# Patient Record
Sex: FEMALE | Race: BLACK OR AFRICAN AMERICAN | Employment: FULL TIME | URBAN - METROPOLITAN AREA
[De-identification: names, ages, dates, MRNs, and addresses within clinical notes are randomized per-mention and may not be internally consistent; named-entity substitution may affect disease eponyms.]

---

## 2017-03-21 ENCOUNTER — HOSPITAL ENCOUNTER (EMERGENCY)
Age: 37
Discharge: HOME | End: 2017-03-21
Attending: INTERNAL MEDICINE | Admitting: INTERNAL MEDICINE
Payer: MEDICAID

## 2017-03-21 VITALS
DIASTOLIC BLOOD PRESSURE: 70 MMHG | BODY MASS INDEX: 20.89 KG/M2 | TEMPERATURE: 98.1 F | HEIGHT: 66 IN | SYSTOLIC BLOOD PRESSURE: 113 MMHG | OXYGEN SATURATION: 99 % | WEIGHT: 130 LBS | HEART RATE: 66 BPM | RESPIRATION RATE: 20 BRPM

## 2017-03-21 DIAGNOSIS — N30.01 ACUTE CYSTITIS WITH HEMATURIA: Primary | ICD-10-CM

## 2017-03-21 DIAGNOSIS — N92.6 ABNORMAL MENSES: ICD-10-CM

## 2017-03-21 DIAGNOSIS — R11.2 NON-INTRACTABLE VOMITING WITH NAUSEA, UNSPECIFIED VOMITING TYPE: ICD-10-CM

## 2017-03-21 LAB
ALBUMIN SERPL BCP-MCNC: 4.4 G/DL (ref 3.4–5)
ALBUMIN/GLOB SERPL: 1 {RATIO} (ref 0.8–1.7)
ALP SERPL-CCNC: 67 U/L (ref 45–117)
ALT SERPL-CCNC: 17 U/L (ref 13–56)
ANION GAP BLD CALC-SCNC: 13 MMOL/L (ref 3–18)
APPEARANCE UR: ABNORMAL
AST SERPL W P-5'-P-CCNC: 19 U/L (ref 15–37)
BACTERIA URNS QL MICRO: ABNORMAL /HPF
BASOPHILS # BLD AUTO: 0 K/UL (ref 0–0.06)
BASOPHILS # BLD: 0 % (ref 0–2)
BILIRUB SERPL-MCNC: 0.5 MG/DL (ref 0.2–1)
BILIRUB UR QL: NEGATIVE
BUN SERPL-MCNC: 11 MG/DL (ref 7–18)
BUN/CREAT SERPL: 14 (ref 12–20)
CALCIUM SERPL-MCNC: 9.1 MG/DL (ref 8.5–10.1)
CHLORIDE SERPL-SCNC: 103 MMOL/L (ref 100–108)
CO2 SERPL-SCNC: 29 MMOL/L (ref 21–32)
COLOR UR: ABNORMAL
CREAT SERPL-MCNC: 0.8 MG/DL (ref 0.6–1.3)
DIFFERENTIAL METHOD BLD: NORMAL
EOSINOPHIL # BLD: 0 K/UL (ref 0–0.4)
EOSINOPHIL NFR BLD: 1 % (ref 0–5)
EPITH CASTS URNS QL MICRO: ABNORMAL /LPF (ref 0–5)
ERYTHROCYTE [DISTWIDTH] IN BLOOD BY AUTOMATED COUNT: 13.2 % (ref 11.6–14.5)
GLOBULIN SER CALC-MCNC: 4.4 G/DL (ref 2–4)
GLUCOSE SERPL-MCNC: 76 MG/DL (ref 74–99)
GLUCOSE UR STRIP.AUTO-MCNC: NEGATIVE MG/DL
HCG UR QL: NEGATIVE
HCT VFR BLD AUTO: 39.1 % (ref 35–45)
HGB BLD-MCNC: 13 G/DL (ref 12–16)
HGB UR QL STRIP: ABNORMAL
KETONES UR QL STRIP.AUTO: NEGATIVE MG/DL
LEUKOCYTE ESTERASE UR QL STRIP.AUTO: ABNORMAL
LYMPHOCYTES # BLD AUTO: 30 % (ref 21–52)
LYMPHOCYTES # BLD: 1.4 K/UL (ref 0.9–3.6)
MCH RBC QN AUTO: 30 PG (ref 24–34)
MCHC RBC AUTO-ENTMCNC: 33.2 G/DL (ref 31–37)
MCV RBC AUTO: 90.3 FL (ref 74–97)
MONOCYTES # BLD: 0.3 K/UL (ref 0.05–1.2)
MONOCYTES NFR BLD AUTO: 6 % (ref 3–10)
MUCOUS THREADS URNS QL MICRO: ABNORMAL /LPF
NEUTS SEG # BLD: 3 K/UL (ref 1.8–8)
NEUTS SEG NFR BLD AUTO: 63 % (ref 40–73)
NITRITE UR QL STRIP.AUTO: NEGATIVE
PH UR STRIP: >8.5 [PH] (ref 5–8)
PLATELET # BLD AUTO: 264 K/UL (ref 135–420)
PMV BLD AUTO: 10.9 FL (ref 9.2–11.8)
POTASSIUM SERPL-SCNC: 3.5 MMOL/L (ref 3.5–5.5)
PROT SERPL-MCNC: 8.8 G/DL (ref 6.4–8.2)
PROT UR STRIP-MCNC: 100 MG/DL
RBC # BLD AUTO: 4.33 M/UL (ref 4.2–5.3)
RBC #/AREA URNS HPF: ABNORMAL /HPF (ref 0–5)
SODIUM SERPL-SCNC: 145 MMOL/L (ref 136–145)
SP GR UR REFRACTOMETRY: 1.02 (ref 1–1.03)
UROBILINOGEN UR QL STRIP.AUTO: 0.2 EU/DL (ref 0.2–1)
WBC # BLD AUTO: 4.7 K/UL (ref 4.6–13.2)
WBC URNS QL MICRO: ABNORMAL /HPF (ref 0–5)

## 2017-03-21 PROCEDURE — 80053 COMPREHEN METABOLIC PANEL: CPT | Performed by: INTERNAL MEDICINE

## 2017-03-21 PROCEDURE — 81001 URINALYSIS AUTO W/SCOPE: CPT | Performed by: PHYSICIAN ASSISTANT

## 2017-03-21 PROCEDURE — 96374 THER/PROPH/DIAG INJ IV PUSH: CPT

## 2017-03-21 PROCEDURE — 96361 HYDRATE IV INFUSION ADD-ON: CPT

## 2017-03-21 PROCEDURE — 99284 EMERGENCY DEPT VISIT MOD MDM: CPT

## 2017-03-21 PROCEDURE — 96375 TX/PRO/DX INJ NEW DRUG ADDON: CPT

## 2017-03-21 PROCEDURE — 74011000258 HC RX REV CODE- 258: Performed by: PHYSICIAN ASSISTANT

## 2017-03-21 PROCEDURE — 81025 URINE PREGNANCY TEST: CPT

## 2017-03-21 PROCEDURE — 74011250636 HC RX REV CODE- 250/636: Performed by: PHYSICIAN ASSISTANT

## 2017-03-21 PROCEDURE — 93005 ELECTROCARDIOGRAM TRACING: CPT

## 2017-03-21 PROCEDURE — 85025 COMPLETE CBC W/AUTO DIFF WBC: CPT | Performed by: INTERNAL MEDICINE

## 2017-03-21 RX ORDER — NITROFURANTOIN 25; 75 MG/1; MG/1
100 CAPSULE ORAL 2 TIMES DAILY
Qty: 14 CAP | Refills: 0 | Status: SHIPPED | OUTPATIENT
Start: 2017-03-21 | End: 2017-03-28

## 2017-03-21 RX ORDER — ONDANSETRON 2 MG/ML
4 INJECTION INTRAMUSCULAR; INTRAVENOUS
Status: COMPLETED | OUTPATIENT
Start: 2017-03-21 | End: 2017-03-21

## 2017-03-21 RX ORDER — ONDANSETRON 4 MG/1
4 TABLET, ORALLY DISINTEGRATING ORAL
Qty: 12 TAB | Refills: 0 | Status: SHIPPED | OUTPATIENT
Start: 2017-03-21 | End: 2017-04-07

## 2017-03-21 RX ADMIN — SODIUM CHLORIDE 1000 ML: 900 INJECTION, SOLUTION INTRAVENOUS at 19:03

## 2017-03-21 RX ADMIN — PROMETHAZINE HYDROCHLORIDE 12.5 MG: 25 INJECTION, SOLUTION INTRAMUSCULAR; INTRAVENOUS at 21:19

## 2017-03-21 RX ADMIN — ONDANSETRON 4 MG: 2 INJECTION INTRAMUSCULAR; INTRAVENOUS at 19:03

## 2017-03-21 NOTE — ED PROVIDER NOTES
[address]  EMERGENCY DEPARTMENT HISTORY AND PHYSICAL EXAM       Date: 3/21/2017   Patient Name: Obdulia Arshad   YOB: 1980  Medical Record Number: [id number]    History of Presenting Illness     Chief Complaint   Patient presents with    Dizziness    Vaginal Bleeding        History Provided By:  patient    Additional History:   6:18 PM  Obdulia Arshad is a 29 y.o. female who presents to the emergency department C/O very heavy vaginal bleeding onset today. Associated symptoms include N/V/D, dizziness, lower abdominal cramping, chest pain secondary to vomiting, and chills. Reports she is currently on her NMP, but states her cycle began differently that her usual cycles because she had more days of spotting before her the heavy bleeding began. States she feels dehydrated. PMHx of anemia. Pt has recently began taking women's daily vitamins. Pt denies speech difficulty, fever, dysuria, urinary frequency, urinary urgency, back pain, cough, congestion, rhinorrhea, sore throat, and any other Sx or complaints. Primary Care Provider: Valentin Espinal MD   Specialist:    Past History     Past Medical History:   Past Medical History:   Diagnosis Date    Genital herpes     Miscarriage     Psychiatric disorder     anxiety, depression        Past Surgical History:   Past Surgical History:   Procedure Laterality Date    HX OTHER SURGICAL              Family History:   History reviewed. No pertinent family history. Social History:   Social History   Substance Use Topics    Smoking status: Former Smoker     Packs/day: 0.25    Smokeless tobacco: None    Alcohol use Yes      Comment: occasionally         Allergies:   No Known Allergies     Review of Systems   Review of Systems   Constitutional: Positive for chills. Negative for fever. HENT: Negative for congestion, rhinorrhea and sore throat. Respiratory: Negative for cough.     Cardiovascular: Positive for chest pain (secondary to emesis). Gastrointestinal: Positive for abdominal pain, diarrhea, nausea and vomiting. Genitourinary: Positive for vaginal bleeding. Negative for dysuria, frequency and urgency. Musculoskeletal: Negative for back pain. Neurological: Positive for dizziness. Negative for speech difficulty. All other systems reviewed and are negative. Physical Exam  Vitals:    03/21/17 2015 03/21/17 2030 03/21/17 2045 03/21/17 2100   BP:  108/65  101/61   Pulse:       Resp:       Temp:       SpO2: 100% 100% 98% 99%   Weight:       Height:           Physical Exam   Nursing note and vitals reviewed. Vital signs and nursing notes reviewed. CONSTITUTIONAL: Alert. Well-appearing; well-nourished; in no apparent distress. HEAD: Normocephalic; atraumatic. EYES: PERRL; EOM's intact. No nystagmus. Conjunctiva clear. ENT: TM's normal. External ear normal. Normal nose; no rhinorrhea. Normal pharynx. Tonsils not enlarged without exudate. Moist mucus membranes. NECK: Supple; FROM without difficulty, non-tender; no cervical lymphadenopathy. CV: Normal S1, S2; no murmurs, rubs, or gallops. No chest wall tenderness. RESPIRATORY: Normal chest excursion with respiration; breath sounds clear and equal bilaterally; no wheezes, rhonchi, or rales. GI: Normal bowel sounds; non-distended; non-tender; no guarding or rigidity; no palpable organomegaly. No CVA tenderness. BACK:  No evidence of trauma or deformity. Non-tender to palpation. FROM without difficulty. Negative straight leg raise bilaterally. EXT: Normal ROM in all four extremities; non-tender to palpation. SKIN: Normal for age and race; warm; dry; good turgor; no apparent lesions or exudate. NEURO: A & O x3. Cranial nerves 2-12 intact. Motor 5/5 bilaterally. Sensation intact. PSYCH:  Mood and affect appropriate.       Diagnostic Study Results     Labs -      Recent Results (from the past 12 hour(s))   CBC WITH AUTOMATED DIFF Collection Time: 03/21/17  6:40 PM   Result Value Ref Range    WBC 4.7 4.6 - 13.2 K/uL    RBC 4.33 4.20 - 5.30 M/uL    HGB 13.0 12.0 - 16.0 g/dL    HCT 39.1 35.0 - 45.0 %    MCV 90.3 74.0 - 97.0 FL    MCH 30.0 24.0 - 34.0 PG    MCHC 33.2 31.0 - 37.0 g/dL    RDW 13.2 11.6 - 14.5 %    PLATELET 300 305 - 295 K/uL    MPV 10.9 9.2 - 11.8 FL    NEUTROPHILS 63 40 - 73 %    LYMPHOCYTES 30 21 - 52 %    MONOCYTES 6 3 - 10 %    EOSINOPHILS 1 0 - 5 %    BASOPHILS 0 0 - 2 %    ABS. NEUTROPHILS 3.0 1.8 - 8.0 K/UL    ABS. LYMPHOCYTES 1.4 0.9 - 3.6 K/UL    ABS. MONOCYTES 0.3 0.05 - 1.2 K/UL    ABS. EOSINOPHILS 0.0 0.0 - 0.4 K/UL    ABS. BASOPHILS 0.0 0.0 - 0.06 K/UL    DF AUTOMATED     METABOLIC PANEL, COMPREHENSIVE    Collection Time: 03/21/17  6:40 PM   Result Value Ref Range    Sodium 145 136 - 145 mmol/L    Potassium 3.5 3.5 - 5.5 mmol/L    Chloride 103 100 - 108 mmol/L    CO2 29 21 - 32 mmol/L    Anion gap 13 3.0 - 18 mmol/L    Glucose 76 74 - 99 mg/dL    BUN 11 7.0 - 18 MG/DL    Creatinine 0.80 0.6 - 1.3 MG/DL    BUN/Creatinine ratio 14 12 - 20      GFR est AA >60 >60 ml/min/1.73m2    GFR est non-AA >60 >60 ml/min/1.73m2    Calcium 9.1 8.5 - 10.1 MG/DL    Bilirubin, total 0.5 0.2 - 1.0 MG/DL    ALT (SGPT) 17 13 - 56 U/L    AST (SGOT) 19 15 - 37 U/L    Alk.  phosphatase 67 45 - 117 U/L    Protein, total 8.8 (H) 6.4 - 8.2 g/dL    Albumin 4.4 3.4 - 5.0 g/dL    Globulin 4.4 (H) 2.0 - 4.0 g/dL    A-G Ratio 1.0 0.8 - 1.7     URINALYSIS W/ RFLX MICROSCOPIC    Collection Time: 03/21/17  6:45 PM   Result Value Ref Range    Color BLOODY      Appearance TURBID      Specific gravity 1.021 1.005 - 1.030      pH (UA) >8.5 (H) 5.0 - 8.0    Protein 100 (A) NEG mg/dL    Glucose NEGATIVE  NEG mg/dL    Ketone NEGATIVE  NEG mg/dL    Bilirubin NEGATIVE  NEG      Blood LARGE (A) NEG      Urobilinogen 0.2 0.2 - 1.0 EU/dL    Nitrites NEGATIVE  NEG      Leukocyte Esterase SMALL (A) NEG     URINE MICROSCOPIC ONLY    Collection Time: 03/21/17  6:45 PM Result Value Ref Range    WBC 4 to 10 0 - 5 /hpf    RBC TOO NUMEROUS TO COUNT 0 - 5 /hpf    Epithelial cells 2+ 0 - 5 /lpf    Bacteria 1+ (A) NEG /hpf    Mucus 1+ (A) NEG /lpf   HCG URINE, QL. - POC    Collection Time: 03/21/17  6:50 PM   Result Value Ref Range    Pregnancy test,urine (POC) NEGATIVE  NEG         Radiologic Studies -  No orders to display        Medical Decision Making   I am the first provider for this patient. I reviewed the vital signs, available nursing notes, past medical history, past surgical history, family history and social history. Vital Signs-Reviewed the patient's vital signs. Patient Vitals for the past 12 hrs:   Temp Pulse Resp BP SpO2   03/21/17 2100 - - - 101/61 99 %   03/21/17 2045 - - - - 98 %   03/21/17 2030 - - - 108/65 100 %   03/21/17 2015 - - - - 100 %   03/21/17 2000 - - - (!) 116/98 -   03/21/17 1930 - 66 - 99/51 100 %   03/21/17 1723 98.1 °F (36.7 °C) 100 20 125/82 100 %       Pulse Oximetry Analysis - Normal 100% on room air     Cardiac Monitor:   Rate: 100 bpm  Rhythm: Normal Sinus Rhythm with sinus arrhythmia     EKG interpretation: (Preliminary)  NSR with sinus arrhythmia. Rate 80 bpm. No STEMI. EKG read by Srikanth Hanson PA-C at 17:35     Old Medical Records: Nursing notes. ED Course:    6:18 PM   Initial assessment performed. PROGRESS NOTE:   8:15 PM  Pt has been re-examined by Srikanth Hanson PA-C. Pt feeling a lot better and would like to try some ginger ale. Medications Given in the ED:  Medications   sodium chloride 0.9 % bolus infusion 1,000 mL (0 mL IntraVENous IV Completed 3/21/17 2003)   ondansetron (ZOFRAN) injection 4 mg (4 mg IntraVENous Given 3/21/17 1903)   promethazine (PHENERGAN) 12.5 mg in 0.9% sodium chloride 50 mL IVPB (12.5 mg IntraVENous New Bag 3/21/17 2119)       Discharge Note:  8:49 PM  Pt has been reexamined. Patient has no new complaints, changes, or physical findings. Care plan outlined and precautions discussed. Results were reviewed with the patient. All medications were reviewed with the patient; will d/c home with Macrobid and Zofran. All of pt's questions and concerns were addressed. Patient was instructed and agrees to follow up with PCP, as well as to return to the ED upon further deterioration. Patient is ready to go home. Diagnosis   Clinical Impression:   1. Acute cystitis with hematuria    2. Non-intractable vomiting with nausea, unspecified vomiting type    3. Abnormal menses         Discussion:  28 y/o female presents with N/V, feeling weak, 1 episode of diarrhea, all with the onset of her menstrual cycle today. It is not heavier than usual. Denies fever. Abd is soft, non-tender. She had a HR of 100 bpm on arrival, but improved with IV hydrating. She is feeling better and was able to drink a little gingerale, but requests something else for nausea. Will give phenergan, PO challenge and d/c home with Zofran and Macrobid. Pt agrees with plan. Follow-up Information     Follow up With Details Gris Samson MD Call in 2 days For follow up with your PCP. [address] [place] [address]      THE [facility] EMERGENCY DEPT Go to As needed, If symptoms worsen. [address] [facility] [address] [phone]          Current Discharge Medication List      START taking these medications    Details   nitrofurantoin, macrocrystal-monohydrate, (MACROBID) 100 mg capsule Take 1 Cap by mouth two (2) times a day for 7 days. Qty: 14 Cap, Refills: 0      ondansetron (ZOFRAN ODT) 4 mg disintegrating tablet Take 1 Tab by mouth every eight (8) hours as needed for Nausea. Qty: 12 Tab, Refills: 0             _______________________________   Attestations: This note is prepared by Kehinde Rose, acting as a Scribe for Joyce Wood PA-C on 6:18 PM on 3/21/2017 .     Joyce Wood PA-C: The scribe's documentation has been prepared under my direction and personally reviewed by me in its entirety.   _______________________________

## 2017-03-21 NOTE — ED TRIAGE NOTES
Patient with complaints of heavy vaginal bleeding that started last night. Patient with complaints of dizziness, shortness of breath and vomiting. .Sepsis Screening completed    (  )Patient meets SIRS criteria. ( x )Patient does not meet SIRS criteria.       SIRS Criteria is achieved when two or more of the following are present   Temperature < 96.8°F (36°C) or > 100.9°F (38.3°C)   Heart Rate > 90 beats per minute   Respiratory Rate > 20 breaths per minute   WBC count > 12,000 or <4,000 or > 10% bands

## 2017-03-21 NOTE — LETTER
[facility] [place] 
THE [facility] EM[facility]GENCY [facility] 
[address] [address] 
[phone] Work/School Note Date: 3/21/2017 To Whom It May concern: 
 
Dustin Mcclain was seen and treated today in the emergency room by the following provider(s): 
Attending Provider: Cayla Beard MD 
Physician Assistant: DINESH Benton. Please excuse Dustin Mcclain from work today, 3/21/17, and tomorrow, 3/22/17.   
 
Sincerely, 
 
 
 
 
Jocelyn Redmond PA-C

## 2017-03-22 LAB
ATRIAL RATE: 80 BPM
CALCULATED P AXIS, ECG09: 55 DEGREES
CALCULATED R AXIS, ECG10: 75 DEGREES
CALCULATED T AXIS, ECG11: 63 DEGREES
DIAGNOSIS, 93000: NORMAL
P-R INTERVAL, ECG05: 142 MS
Q-T INTERVAL, ECG07: 386 MS
QRS DURATION, ECG06: 88 MS
QTC CALCULATION (BEZET), ECG08: 445 MS
VENTRICULAR RATE, ECG03: 80 BPM

## 2017-03-22 NOTE — DISCHARGE INSTRUCTIONS
Abnormal Uterine Bleeding: Care Instructions  Your Care Instructions  Abnormal uterine bleeding (AUB) is irregular bleeding from the uterus that is longer or heavier than usual or does not occur at your regular time. Sometimes it is caused by changes in hormone levels. It can also be caused by growths in the uterus, such as fibroids or polyps. Sometimes a cause cannot be found. You may have heavy bleeding when you are not expecting your period. Your doctor may suggest a pregnancy test, if you think you are pregnant. Follow-up care is a key part of your treatment and safety. Be sure to make and go to all appointments, and call your doctor if you are having problems. It's also a good idea to know your test results and keep a list of the medicines you take. How can you care for yourself at home? · Be safe with medicines. Take pain medicines exactly as directed. ¨ If the doctor gave you a prescription medicine for pain, take it as prescribed. ¨ If you are not taking a prescription pain medicine, ask your doctor if you can take an over-the-counter medicine. · You may be low in iron because of blood loss. Eat a balanced diet that is high in iron and vitamin C. Foods rich in iron include red meat, shellfish, eggs, beans, and leafy green vegetables. Talk to your doctor about whether you need to take iron pills or a multivitamin. When should you call for help? Call 911 anytime you think you may need emergency care. For example, call if:  · You passed out (lost consciousness). Call your doctor now or seek immediate medical care if:  · You have sudden, severe pain in your belly or pelvis. · You have severe vaginal bleeding. You are soaking through your usual pads or tampons every hour for 2 or more hours. · You feel dizzy or lightheaded, or you feel like you may faint. Watch closely for changes in your health, and be sure to contact your doctor if:  · You have new belly or pelvic pain.   · You have a fever.  · Your bleeding gets worse or lasts longer than 1 week. · You think you may be pregnant. Where can you learn more? Go to http://yuri-eva.info/. Enter W432 in the search box to learn more about \"Abnormal Uterine Bleeding: Care Instructions. \"  Current as of: February 25, 2016  Content Version: 11.1  © 2006-2016 [employer]. Care instructions adapted under license by [employer] (which disclaims liability or warranty for this information). If you have questions about a medical condition or this instruction, always ask your healthcare professional. [place] [address] any warranty or liability for your use of this information. Nausea and Vomiting: Care Instructions  Your Care Instructions    When you are nauseated, you may feel weak and sweaty and notice a lot of saliva in your mouth. Nausea often leads to vomiting. Most of the time you do not need to worry about nausea and vomiting, but they can be signs of other illnesses. Two common causes of nausea and vomiting are stomach flu and food poisoning. Nausea and vomiting from viral stomach flu will usually start to improve within 24 hours. Nausea and vomiting from food poisoning may last from 12 to 48 hours. The doctor has checked you carefully, but problems can develop later. If you notice any problems or new symptoms, get medical treatment right away. Follow-up care is a key part of your treatment and safety. Be sure to make and go to all appointments, and call your doctor if you are having problems. It's also a good idea to know your test results and keep a list of the medicines you take. How can you care for yourself at home? · To prevent dehydration, drink plenty of fluids, enough so that your urine is light yellow or clear like water. Choose water and other caffeine-free clear liquids until you feel better.  If you have kidney, heart, or liver disease and have to limit fluids, talk with your doctor before you increase the amount of fluids you drink. · Rest in bed until you feel better. · When you are able to eat, try clear soups, mild foods, and liquids until all symptoms are gone for 12 to 48 hours. Other good choices include dry toast, crackers, cooked cereal, and gelatin dessert, such as Jell-O. When should you call for help? Call 911 anytime you think you may need emergency care. For example, call if:  · You passed out (lost consciousness). Call your doctor now or seek immediate medical care if:  · You have symptoms of dehydration, such as:  ¨ Dry eyes and a dry mouth. ¨ Passing only a little dark urine. ¨ Feeling thirstier than usual.  · You have new or worsening belly pain. · You have a new or higher fever. · You vomit blood or what looks like coffee grounds. Watch closely for changes in your health, and be sure to contact your doctor if:  · You have ongoing nausea and vomiting. · Your vomiting is getting worse. · Your vomiting lasts longer than 2 days. · You are not getting better as expected. Where can you learn more? Go to http://yuri-eva.info/. Enter 25 [id number] in the search box to learn more about \"Nausea and Vomiting: Care Instructions. \"  Current as of: May 27, 2016  Content Version: 11.1  © 5395-2705 [employer]. Care instructions adapted under license by [employer] (which disclaims liability or warranty for this information). If you have questions about a medical condition or this instruction, always ask your healthcare professional. [place] [address] any warranty or liability for your use of this information. Urinary Tract Infection in Women: Care Instructions  Your Care Instructions    A urinary tract infection, or UTI, is a general term for an infection anywhere between the kidneys and the urethra (where urine comes out). Most UTIs are bladder infections.  They often cause pain or burning when you urinate. UTIs are caused by bacteria and can be cured with antibiotics. Be sure to complete your treatment so that the infection goes away. Follow-up care is a key part of your treatment and safety. Be sure to make and go to all appointments, and call your doctor if you are having problems. It's also a good idea to know your test results and keep a list of the medicines you take. How can you care for yourself at home? · Take your antibiotics as directed. Do not stop taking them just because you feel better. You need to take the full course of antibiotics. · Drink extra water and other fluids for the next day or two. This may help wash out the bacteria that are causing the infection. (If you have kidney, heart, or liver disease and have to limit fluids, talk with your doctor before you increase your fluid intake.)  · Avoid drinks that are carbonated or have caffeine. They can irritate the bladder. · Urinate often. Try to empty your bladder each time. · To relieve pain, take a hot bath or lay a heating pad set on low over your lower belly or genital area. Never go to sleep with a heating pad in place. To prevent UTIs  · Drink plenty of water each day. This helps you urinate often, which clears bacteria from your system. (If you have kidney, heart, or liver disease and have to limit fluids, talk with your doctor before you increase your fluid intake.)  · Consider adding cranberry juice to your diet. · Urinate when you need to. · Urinate right after you have sex. · Change sanitary pads often. · Avoid douches, bubble baths, feminine hygiene sprays, and other feminine hygiene products that have deodorants. · After going to the bathroom, wipe from front to back. When should you call for help? Call your doctor now or seek immediate medical care if:  · Symptoms such as fever, chills, nausea, or vomiting get worse or appear for the first time. · You have new pain in your back just below your rib cage.  This is called flank pain. · There is new blood or pus in your urine. · You have any problems with your antibiotic medicine. Watch closely for changes in your health, and be sure to contact your doctor if:  · You are not getting better after taking an antibiotic for 2 days. · Your symptoms go away but then come back. Where can you learn more? Go to http://yuri-eva.info/. Enter G007 in the search box to learn more about \"Urinary Tract Infection in Women: Care Instructions. \"  Current as of: August 12, 2016  Content Version: 11.1  © 7603-8478 [employer]. Care instructions adapted under license by [employer] (which disclaims liability or warranty for this information). If you have questions about a medical condition or this instruction, always ask your healthcare professional. [address] any warranty or liability for your use of this information.

## 2017-04-07 ENCOUNTER — HOSPITAL ENCOUNTER (EMERGENCY)
Age: 37
Discharge: HOME | End: 2017-04-07
Attending: EMERGENCY MEDICINE
Payer: MEDICAID

## 2017-04-07 VITALS
WEIGHT: 140 LBS | HEIGHT: 66 IN | RESPIRATION RATE: 18 BRPM | HEART RATE: 85 BPM | BODY MASS INDEX: 22.5 KG/M2 | SYSTOLIC BLOOD PRESSURE: 102 MMHG | DIASTOLIC BLOOD PRESSURE: 60 MMHG | OXYGEN SATURATION: 100 % | TEMPERATURE: 98.2 F

## 2017-04-07 DIAGNOSIS — N89.8 VAGINAL IRRITATION: Primary | ICD-10-CM

## 2017-04-07 LAB
APPEARANCE UR: NORMAL
BILIRUB UR QL: NEGATIVE
COLOR UR: YELLOW
GLUCOSE UR STRIP.AUTO-MCNC: NEGATIVE MG/DL
HCG UR QL: NEGATIVE
HGB UR QL STRIP: NEGATIVE
KETONES UR QL STRIP.AUTO: NEGATIVE MG/DL
LEUKOCYTE ESTERASE UR QL STRIP.AUTO: NEGATIVE
NITRITE UR QL STRIP.AUTO: NEGATIVE
PH UR STRIP: 7 [PH] (ref 5–8)
PROT UR STRIP-MCNC: NEGATIVE MG/DL
SP GR UR REFRACTOMETRY: 1.02 (ref 1–1.03)
UROBILINOGEN UR QL STRIP.AUTO: 0.2 EU/DL (ref 0.2–1)

## 2017-04-07 PROCEDURE — 77030002986 HC SUT PROL J&J -A

## 2017-04-07 PROCEDURE — 99283 EMERGENCY DEPT VISIT LOW MDM: CPT

## 2017-04-07 PROCEDURE — 81003 URINALYSIS AUTO W/O SCOPE: CPT | Performed by: PHYSICIAN ASSISTANT

## 2017-04-07 PROCEDURE — 81025 URINE PREGNANCY TEST: CPT

## 2017-04-07 RX ORDER — TRAMADOL HYDROCHLORIDE 50 MG/1
50 TABLET ORAL
COMMUNITY
End: 2017-06-07 | Stop reason: ALTCHOICE

## 2017-04-07 RX ORDER — FLUCONAZOLE 150 MG/1
150 TABLET ORAL
Qty: 1 TAB | Refills: 0 | Status: SHIPPED | OUTPATIENT
Start: 2017-04-07 | End: 2017-04-07

## 2017-04-07 RX ORDER — IBUPROFEN 800 MG/1
800 TABLET ORAL
Qty: 20 TAB | Refills: 0 | Status: SHIPPED | OUTPATIENT
Start: 2017-04-07 | End: 2017-04-07

## 2017-04-07 RX ORDER — IBUPROFEN 800 MG/1
800 TABLET ORAL
Qty: 20 TAB | Refills: 0 | Status: SHIPPED | OUTPATIENT
Start: 2017-04-07 | End: 2017-04-14

## 2017-04-07 NOTE — ED TRIAGE NOTES
Pt states \" i was seen here several weeks ago told i had a UTI i couldn't get the meds filled. I went to [facility] and they said i had bacterial vaginosis. I have been taking the cream but now i am having a nasty discharge and lower pelvic pain. \"

## 2017-04-07 NOTE — ED PROVIDER NOTES
HPI Comments:   12:57 AM     Chong Mcguire is a 29 y.o. female who presents to the ED c/o suprapubic abdominal pain and vaginal discharge/irritation onset few days ago. Associated symptoms include dysuria. Pt was seen at this facility on 3/21/17 and was diagnosed with a UTI. Pt reports she was evaluated at [facility] 5 days ago, had a pelvic exam, was diagnosed with BV, and was Rx'd Metronidazole gel. Pt states she has not had sexual intercourse since diagnosed and does not have concern for STD's. Last bowel movement was a few days ago. LMP 3/21/17. Pt denies fever, nausea/vomiting/diarrhea, urinary frequency, and any other symptoms or complaints. Patient is a 29 y.o. female presenting with vaginal discharge. The history is provided by the patient. No  was used. Vaginal Discharge    This is a new problem. The current episode started yesterday. The problem occurs constantly. The problem has not changed since onset. Associated symptoms include abdominal pain (suprapubic) and dysuria. Pertinent negatives include no fever, no diarrhea, no nausea, no vomiting and no frequency. Past Medical History:   Diagnosis Date    Genital herpes     Miscarriage     Psychiatric disorder     anxiety, depression       Past Surgical History:   Procedure Laterality Date    HX OTHER SURGICAL               History reviewed. No pertinent family history. Social History     Social History    Marital status: SINGLE     Spouse name: N/A    Number of children: N/A    Years of education: N/A     Occupational History    Not on file.      Social History Main Topics    Smoking status: Former Smoker     Packs/day: 0.25    Smokeless tobacco: Not on file    Alcohol use Yes      Comment: occasionally     Drug use: Yes     Special: Marijuana, Cocaine      Comment: spice    Sexual activity: Not on file     Other Topics Concern    Not on file     Social History Narrative ALLERGIES: Review of patient's allergies indicates no known allergies. Review of Systems   Constitutional: Negative for fever. Gastrointestinal: Positive for abdominal pain (suprapubic). Negative for diarrhea, nausea and vomiting. Genitourinary: Positive for dysuria and vaginal discharge. Negative for frequency. All other systems reviewed and are negative. Vitals:    04/07/17 0021   BP: 102/60   Pulse: 85   Resp: 18   Temp: 98.2 °F (36.8 °C)   SpO2: 100%   Weight: 63.5 kg (140 lb)   Height: 5' 6\" (1.676 m)            Physical Exam   Constitutional: She is oriented to person, place, and time. She appears well-developed and well-nourished. No distress. Non toxic, well appearing, NAD    HENT:   Head: Normocephalic and atraumatic. Neck: Normal range of motion. Neck supple. Cardiovascular: Normal rate, regular rhythm, normal heart sounds and intact distal pulses. No murmur heard. Pulmonary/Chest: Effort normal and breath sounds normal. No respiratory distress. She has no wheezes. She has no rales. Abdominal: Soft. Bowel sounds are normal. There is tenderness (minimal ) in the suprapubic area. There is no rigidity, no rebound, no guarding and no CVA tenderness. Genitourinary:   Genitourinary Comments: Deferred, recent pelvic exam performed at [facility]. GC/Chlamydia negative, tx for BV. Neurological: She is alert and oriented to person, place, and time. Psychiatric: She has a normal mood and affect. Judgment normal.   Nursing note and vitals reviewed. RESULTS:      No orders to display        Labs Reviewed   URINALYSIS W/ RFLX MICROSCOPIC   HCG URINE, QL. - POC       No results found for this or any previous visit (from the past 12 hour(s)).      MDM  Number of Diagnoses or Management Options  Vaginal irritation:      Amount and/or Complexity of Data Reviewed  Clinical lab tests: ordered and reviewed      ED Course     MEDICATIONS GIVEN:  Medications - No data to display Procedures      PROGRESS NOTE:  12:57 AM  Initial assessment performed. CONSULT NOTE:   2:21 PM  Yeny Wise PA-C spoke with Prashanth Damon. Chela Du MD   Specialty: ED attending   Discussed pt's hx, disposition, and available diagnostic and imaging results. Reviewed care plans. Consulting physician agrees with plans as outlined. Will not repeat pelvic exam, will check UA, tx with diflucan. Written by Randy Maloney PA-C    DISCHARGE NOTE:  1:52 AM   Brigitte Cervantes's  results have been reviewed with her. She has been counseled regarding her diagnosis, treatment, and plan. She verbally conveys understanding and agreement of the signs, symptoms, diagnosis, treatment and prognosis and additionally agrees to follow up as discussed. She also agrees with the care-plan and conveys that all of her questions have been answered. I have also provided discharge instructions for her that include: educational information regarding their diagnosis and treatment, and list of reasons why they would want to return to the ED prior to their follow-up appointment, should her condition change. The patient and/or family has been provided with education for proper Emergency Department utilization. CLINICAL IMPRESSION:    1. Vaginal irritation        PLAN: DISCHARGE HOME    Follow-up Information     Follow up With Details Comments Nella Wolfe MD Schedule an appointment as soon as possible for a visit  [address] [place] [address]  [phone]      [facility] EMERGENCY DEPT  As needed, If symptoms worsen [address] [place]  [phone]          Discharge Medication List as of 4/7/2017  1:57 AM          ATTESTATIONS:  This note is prepared by Gia Mcdermott, acting as Scribe for Yeny Wise PA-C .     Yeny Wise PA-C: The scribe's documentation has been prepared under my direction and personally reviewed by me in its entirety. I confirm that the note above accurately reflects all work, treatment, procedures, and medical decision making performed by me.

## 2017-04-07 NOTE — DISCHARGE INSTRUCTIONS

## 2017-06-07 ENCOUNTER — HOSPITAL ENCOUNTER (EMERGENCY)
Age: 37
Discharge: HOME | End: 2017-06-07
Attending: EMERGENCY MEDICINE
Payer: COMMERCIAL

## 2017-06-07 VITALS
BODY MASS INDEX: 22.5 KG/M2 | WEIGHT: 140 LBS | SYSTOLIC BLOOD PRESSURE: 106 MMHG | RESPIRATION RATE: 16 BRPM | HEART RATE: 70 BPM | TEMPERATURE: 98.6 F | HEIGHT: 66 IN | DIASTOLIC BLOOD PRESSURE: 50 MMHG | OXYGEN SATURATION: 100 %

## 2017-06-07 DIAGNOSIS — K05.219 PERIODONTAL ABSCESS: Primary | ICD-10-CM

## 2017-06-07 PROCEDURE — 74011250637 HC RX REV CODE- 250/637: Performed by: EMERGENCY MEDICINE

## 2017-06-07 PROCEDURE — 99283 EMERGENCY DEPT VISIT LOW MDM: CPT

## 2017-06-07 RX ORDER — FLUCONAZOLE 150 MG/1
150 TABLET ORAL
Qty: 1 TAB | Refills: 0 | Status: SHIPPED | OUTPATIENT
Start: 2017-06-07 | End: 2017-06-07

## 2017-06-07 RX ORDER — NAPROXEN 250 MG/1
500 TABLET ORAL 2 TIMES DAILY WITH MEALS
Status: DISCONTINUED | OUTPATIENT
Start: 2017-06-07 | End: 2017-06-07 | Stop reason: HOSPADM

## 2017-06-07 RX ORDER — CEPHALEXIN 250 MG/1
250 CAPSULE ORAL 4 TIMES DAILY
Qty: 40 CAP | Refills: 0 | Status: SHIPPED | OUTPATIENT
Start: 2017-06-07 | End: 2017-08-29

## 2017-06-07 RX ORDER — CEPHALEXIN 250 MG/1
500 CAPSULE ORAL
Status: COMPLETED | OUTPATIENT
Start: 2017-06-07 | End: 2017-06-07

## 2017-06-07 RX ORDER — NAPROXEN 250 MG/1
500 TABLET ORAL 2 TIMES DAILY WITH MEALS
Status: DISCONTINUED | OUTPATIENT
Start: 2017-06-07 | End: 2017-06-07

## 2017-06-07 RX ORDER — HYDROCODONE BITARTRATE AND ACETAMINOPHEN 5; 325 MG/1; MG/1
TABLET ORAL
Qty: 20 TAB | Refills: 0 | Status: SHIPPED | OUTPATIENT
Start: 2017-06-07 | End: 2017-08-29

## 2017-06-07 RX ORDER — NAPROXEN 500 MG/1
500 TABLET ORAL 2 TIMES DAILY WITH MEALS
Qty: 20 TAB | Refills: 0 | Status: SHIPPED | OUTPATIENT
Start: 2017-06-07 | End: 2017-06-17

## 2017-06-07 RX ADMIN — NAPROXEN 500 MG: 250 TABLET ORAL at 06:12

## 2017-06-07 RX ADMIN — CEPHALEXIN 500 MG: 250 CAPSULE ORAL at 06:07

## 2017-06-07 NOTE — ED PROVIDER NOTES
HPI Comments: 5:40 AM    Michel Felder is a 29 y.o. female with pertinent PMHx of anxiety presenting ambulatory to the ED c/o right upper dental pain x 2-3 days. Pt states that her pain has become worse since onset and is increased with swallowing. Pt notes that the pain radiates to her right ear. Pt denies trying any medications for her pain this morning. Pt states that she has a dental appointment in July. Pt denies any history of HTN, DM or lupus. Pt specifically denies any fever/chills. PCP: Fran Nair MD  Dentist: Dr. Milad Tapia DDS  Social Hx: + tobacco use, + alcohol use, + illicit drug use (marijuana)    There are no other complaints, changes, or physical findings at this time. The history is provided by the patient. No  was used. Past Medical History:   Diagnosis Date    Genital herpes     Miscarriage     Psychiatric disorder     anxiety, depression       Past Surgical History:   Procedure Laterality Date    HX OTHER SURGICAL               History reviewed. No pertinent family history. Social History     Social History    Marital status: SINGLE     Spouse name: N/A    Number of children: N/A    Years of education: N/A     Occupational History    Not on file. Social History Main Topics    Smoking status: Former Smoker     Packs/day: 0.25    Smokeless tobacco: Not on file    Alcohol use Yes      Comment: occasionally     Drug use: Yes     Special: Marijuana, Cocaine      Comment: spice    Sexual activity: Not on file     Other Topics Concern    Not on file     Social History Narrative         ALLERGIES: Review of patient's allergies indicates no known allergies. Review of Systems   Constitutional: Negative for chills and fever. HENT: Positive for dental problem (right upper) and ear pain (right). All other systems reviewed and are negative.       Vitals:    17 0543   BP: 106/50   Pulse: 70   Resp: 16   Temp: 98.6 °F (37 °C)   SpO2: 100%   Weight: 63.5 kg (140 lb)   Height: 5' 6\" (1.676 m)            Physical Exam   Constitutional: She is oriented to person, place, and time. She appears well-developed and well-nourished. No distress. HENT:   Head: Normocephalic and atraumatic.   fullness to the right upper jaw, adjacent to teeth #3 and 4. Carries visible. No trismus. Floor of her mouth is soft. No appreciable cervical adenopathy. Eyes: EOM are normal. Pupils are equal, round, and reactive to light. Neck: Normal range of motion. Neck supple. Cardiovascular: Normal rate, normal heart sounds and intact distal pulses. Pulmonary/Chest: Effort normal and breath sounds normal. No stridor. No respiratory distress. Abdominal: Soft. Bowel sounds are normal. She exhibits no distension. There is no tenderness. Musculoskeletal: Normal range of motion. She exhibits no edema or tenderness. Lymphadenopathy:     She has no cervical adenopathy. Neurological: She is alert and oriented to person, place, and time. Skin: Skin is warm and dry. No rash noted. Psychiatric: She has a normal mood and affect. Her behavior is normal.   Nursing note and vitals reviewed. RESULTS:    PULSE OXIMETRY NOTE:  Pulse-ox is 100% on RA  Interpretation: NML       No orders to display        Labs Reviewed - No data to display    No results found for this or any previous visit (from the past 12 hour(s)). MDM  Number of Diagnoses or Management Options  Diagnosis management comments: Clinically she has periodontal abscess, but no findings of royces. No PTA. No RPA. Amount and/or Complexity of Data Reviewed  Review and summarize past medical records: yes      ED Course     Medications   naproxen (NAPROSYN) tablet 500 mg (500 mg Oral Given 6/7/17 0612)   cephALEXin (KEFLEX) capsule 500 mg (500 mg Oral Given 6/7/17 7255)        Procedures    PROGRESS NOTE:  5:40 AM  Initial assessment performed.   Written by Brianne Gan ED Scribwalt, as dictated by Remington Villegas. Candido Vargas MD.    DISCHARGE NOTE:  6:06 AM  The patient is ready for discharge. The patient's signs, symptoms, diagnosis, and discharge instructions have been discussed and the patient and/or family has conveyed their understanding. The patient and/or family is to follow up as recommended or return to the ER should their symptoms worsen. Plan has been discussed and the patient and/or family is in agreement. Written by Princess Woods ED Scribe, as dictated by Lazarus Vargas MD.     CLINICAL IMPRESSION:    1. Periodontal abscess        PLAN:  1. D/C Home  2. Discharge Medication List as of 6/7/2017  6:06 AM      START taking these medications    Details   HYDROcodone-acetaminophen (NORCO) 5-325 mg per tablet Take 1-2 tablets PO every 4-6 hours as needed for pain control. If over the counter ibuprofen or acetaminophen was suggested, then only take the vicodin for pain not well controlled with the over the counter medication. , Print, Disp-20 Tab, R-0      naproxen (NAPROSYN) 500 mg tablet Take 1 Tab by mouth two (2) times daily (with meals) for 10 days. , Normal, Disp-20 Tab, R-0      cephALEXin (KEFLEX) 250 mg capsule Take 1 Cap by mouth four (4) times daily. , Normal, Disp-40 Cap, R-0         CONTINUE these medications which have NOT CHANGED    Details   ACYCLOVIR, BULK, by Does Not Apply route., Historical Med         STOP taking these medications       traMADol (ULTRAM) 50 mg tablet Comments:   Reason for Stopping:             3.   Follow-up Information     Follow up With Details Comments Contact Info    Dr. Rhenda Edelson Call for dental follow up ASAP [phone]    [facility] EMERGENCY DEPT  As needed, If symptoms worsen [address] [place] [address] [phone]          SCRIBE ATTESTATION:  This note is prepared by Kary Shelton, acting as Scribe for Remington Villegas. Candido Vargas MD.    PROVIDER ATTESTATION:  Remington Villegas.  Candido Vargas MD: The scribe's documentation has been prepared under my direction and personally reviewed by me in its entirety. I confirm that the note above accurately reflects all work, treatment, procedures, and medical decision making performed by me.

## 2017-06-07 NOTE — DISCHARGE INSTRUCTIONS
Periodontal Abscess: Care Instructions  Your Care Instructions  A periodontal abscess is a pocket of pus in the tissues of the gum. It looks like a small red ball pushing out of the swollen gum. An abscess can occur with serious gum disease (periodontitis), which causes the gums to pull away from the teeth. This leaves deep pockets where bacteria can grow. If tartar builds up too much, or if food gets stuck in the pockets, pus forms. If the pus can't drain, it forms an abscess. An abscess can cause a fever and a throbbing pain in nearby teeth. It can also cause long-term damage to your teeth and gums. The teeth may get loose and fall out. The infection can spread to another part of your body. In most cases, your dentist will give you antibiotics to stop the infection. He or she may need to cut open (nell) the abscess so that the infection can drain. This should relieve your pain. You may also need more dental treatment, such as tooth removal or oral surgery to fix bone damage caused by the abscess. Follow-up care is a key part of your treatment and safety. Be sure to make and go to all appointments, and call your doctor if you are having problems. It's also a good idea to know your test results and keep a list of the medicines you take. How can you care for yourself at home? · Reduce pain and swelling in your face and jaw by putting ice or a cold pack on the outside of your cheek for 10 to 20 minutes at a time. Put a thin cloth between the ice and your skin. · Take pain medicines exactly as directed. ¨ If the doctor gave you a prescription medicine for pain, take it as prescribed. ¨ If you are not taking a prescription pain medicine, ask your doctor if you can take an over-the-counter medicine. · Take your antibiotics as directed. Do not stop taking them just because you feel better. You need to take the full course of antibiotics.   To prevent periodontal abscess  · Brush and floss every day, and have regular dental checkups. · Eat a healthy diet, and avoid sugary foods and drinks. · Do not smoke or use spit tobacco. Tobacco use slows your ability to heal. It also increases your risk for gum disease and cancer of the mouth and throat. If you need help quitting, talk to your doctor about stop-smoking programs and medicines. These can increase your chances of quitting for good. When should you call for help? Call 911 anytime you think you may need emergency care. For example, call if:  · You have severe trouble breathing. Call your doctor now or seek immediate medical care if:  · You have new pain, or your pain gets a lot worse. · You have new symptoms, such as a fever, swelling in or around the abscess, or problems swallowing. Watch closely for changes in your health, and be sure to contact your doctor if:  · You do not get better as expected. Where can you learn more? Go to http://yuri-eva.info/. Enter O179 in the search box to learn more about \"Periodontal Abscess: Care Instructions. \"  Current as of: August 9, 2016  Content Version: 11.2  © 0870-9014 [employer]. Care instructions adapted under license by [employer] (which disclaims liability or warranty for this information). If you have questions about a medical condition or this instruction, always ask your healthcare professional. [address] any warranty or liability for your use of this information.

## 2017-08-29 ENCOUNTER — APPOINTMENT (OUTPATIENT)
Dept: ULTRASOUND IMAGING | Age: 37
End: 2017-08-29
Attending: PHYSICIAN ASSISTANT
Payer: COMMERCIAL

## 2017-08-29 ENCOUNTER — HOSPITAL ENCOUNTER (EMERGENCY)
Age: 37
Discharge: HOME | End: 2017-08-29
Attending: EMERGENCY MEDICINE
Payer: COMMERCIAL

## 2017-08-29 VITALS
WEIGHT: 144 LBS | SYSTOLIC BLOOD PRESSURE: 102 MMHG | HEART RATE: 67 BPM | RESPIRATION RATE: 18 BRPM | OXYGEN SATURATION: 100 % | HEIGHT: 66 IN | DIASTOLIC BLOOD PRESSURE: 60 MMHG | BODY MASS INDEX: 23.14 KG/M2 | TEMPERATURE: 98.3 F

## 2017-08-29 DIAGNOSIS — B96.89 BV (BACTERIAL VAGINOSIS): ICD-10-CM

## 2017-08-29 DIAGNOSIS — Z3A.01 PREGNANCY WITH 6 COMPLETED WEEKS GESTATION: ICD-10-CM

## 2017-08-29 DIAGNOSIS — R10.2 PELVIC PAIN DURING PREGNANCY: Primary | ICD-10-CM

## 2017-08-29 DIAGNOSIS — O26.899 PELVIC PAIN DURING PREGNANCY: Primary | ICD-10-CM

## 2017-08-29 DIAGNOSIS — N76.0 BV (BACTERIAL VAGINOSIS): ICD-10-CM

## 2017-08-29 LAB
ALBUMIN SERPL-MCNC: 3.9 G/DL (ref 3.4–5)
ALBUMIN/GLOB SERPL: 1 {RATIO} (ref 0.8–1.7)
ALP SERPL-CCNC: 59 U/L (ref 45–117)
ALT SERPL-CCNC: 20 U/L (ref 13–56)
ANION GAP SERPL CALC-SCNC: 10 MMOL/L (ref 3–18)
APPEARANCE UR: CLEAR
AST SERPL-CCNC: 18 U/L (ref 15–37)
BASOPHILS # BLD: 0 K/UL (ref 0–0.06)
BASOPHILS NFR BLD: 0 % (ref 0–2)
BILIRUB SERPL-MCNC: 0.5 MG/DL (ref 0.2–1)
BILIRUB UR QL: NEGATIVE
BUN SERPL-MCNC: 13 MG/DL (ref 7–18)
BUN/CREAT SERPL: 14 (ref 12–20)
CALCIUM SERPL-MCNC: 8.8 MG/DL (ref 8.5–10.1)
CHLORIDE SERPL-SCNC: 103 MMOL/L (ref 100–108)
CO2 SERPL-SCNC: 24 MMOL/L (ref 21–32)
COLOR UR: YELLOW
CREAT SERPL-MCNC: 0.94 MG/DL (ref 0.6–1.3)
DIFFERENTIAL METHOD BLD: NORMAL
EOSINOPHIL # BLD: 0.1 K/UL (ref 0–0.4)
EOSINOPHIL NFR BLD: 2 % (ref 0–5)
ERYTHROCYTE [DISTWIDTH] IN BLOOD BY AUTOMATED COUNT: 12.7 % (ref 11.6–14.5)
GLOBULIN SER CALC-MCNC: 4.1 G/DL (ref 2–4)
GLUCOSE SERPL-MCNC: 98 MG/DL (ref 74–99)
GLUCOSE UR STRIP.AUTO-MCNC: NEGATIVE MG/DL
HCG SERPL-ACNC: ABNORMAL MIU/ML (ref 1–6)
HCG UR QL: POSITIVE
HCT VFR BLD AUTO: 38.8 % (ref 35–45)
HGB BLD-MCNC: 13.5 G/DL (ref 12–16)
HGB UR QL STRIP: NEGATIVE
KETONES UR QL STRIP.AUTO: 15 MG/DL
LEUKOCYTE ESTERASE UR QL STRIP.AUTO: NEGATIVE
LYMPHOCYTES # BLD: 1.6 K/UL (ref 0.9–3.6)
LYMPHOCYTES NFR BLD: 30 % (ref 21–52)
MCH RBC QN AUTO: 30.7 PG (ref 24–34)
MCHC RBC AUTO-ENTMCNC: 34.8 G/DL (ref 31–37)
MCV RBC AUTO: 88.2 FL (ref 74–97)
MONOCYTES # BLD: 0.3 K/UL (ref 0.05–1.2)
MONOCYTES NFR BLD: 6 % (ref 3–10)
NEUTS SEG # BLD: 3.2 K/UL (ref 1.8–8)
NEUTS SEG NFR BLD: 62 % (ref 40–73)
NITRITE UR QL STRIP.AUTO: NEGATIVE
PH UR STRIP: 6 [PH] (ref 5–8)
PLATELET # BLD AUTO: 220 K/UL (ref 135–420)
PMV BLD AUTO: 10.1 FL (ref 9.2–11.8)
POTASSIUM SERPL-SCNC: 3.5 MMOL/L (ref 3.5–5.5)
PROT SERPL-MCNC: 8 G/DL (ref 6.4–8.2)
PROT UR STRIP-MCNC: NEGATIVE MG/DL
RBC # BLD AUTO: 4.4 M/UL (ref 4.2–5.3)
SERVICE CMNT-IMP: NORMAL
SODIUM SERPL-SCNC: 137 MMOL/L (ref 136–145)
SP GR UR REFRACTOMETRY: 1.02 (ref 1–1.03)
UROBILINOGEN UR QL STRIP.AUTO: 1 EU/DL (ref 0.2–1)
WBC # BLD AUTO: 5.1 K/UL (ref 4.6–13.2)
WET PREP GENITAL: NORMAL

## 2017-08-29 PROCEDURE — 76817 TRANSVAGINAL US OBSTETRIC: CPT

## 2017-08-29 PROCEDURE — 87491 CHLMYD TRACH DNA AMP PROBE: CPT | Performed by: PHYSICIAN ASSISTANT

## 2017-08-29 PROCEDURE — 96361 HYDRATE IV INFUSION ADD-ON: CPT

## 2017-08-29 PROCEDURE — 81003 URINALYSIS AUTO W/O SCOPE: CPT | Performed by: PHYSICIAN ASSISTANT

## 2017-08-29 PROCEDURE — 81025 URINE PREGNANCY TEST: CPT

## 2017-08-29 PROCEDURE — 84702 CHORIONIC GONADOTROPIN TEST: CPT | Performed by: PHYSICIAN ASSISTANT

## 2017-08-29 PROCEDURE — 80053 COMPREHEN METABOLIC PANEL: CPT | Performed by: PHYSICIAN ASSISTANT

## 2017-08-29 PROCEDURE — 87210 SMEAR WET MOUNT SALINE/INK: CPT | Performed by: PHYSICIAN ASSISTANT

## 2017-08-29 PROCEDURE — 74011250636 HC RX REV CODE- 250/636: Performed by: PHYSICIAN ASSISTANT

## 2017-08-29 PROCEDURE — 99285 EMERGENCY DEPT VISIT HI MDM: CPT

## 2017-08-29 PROCEDURE — 96374 THER/PROPH/DIAG INJ IV PUSH: CPT

## 2017-08-29 PROCEDURE — 85025 COMPLETE CBC W/AUTO DIFF WBC: CPT | Performed by: PHYSICIAN ASSISTANT

## 2017-08-29 RX ORDER — ONDANSETRON 2 MG/ML
4 INJECTION INTRAMUSCULAR; INTRAVENOUS
Status: COMPLETED | OUTPATIENT
Start: 2017-08-29 | End: 2017-08-29

## 2017-08-29 RX ORDER — METRONIDAZOLE 500 MG/1
500 TABLET ORAL 2 TIMES DAILY
Qty: 14 TAB | Refills: 0 | Status: SHIPPED | OUTPATIENT
Start: 2017-08-29 | End: 2017-09-05

## 2017-08-29 RX ORDER — ONDANSETRON 4 MG/1
4 TABLET, ORALLY DISINTEGRATING ORAL
Qty: 20 TAB | Refills: 0 | Status: SHIPPED | OUTPATIENT
Start: 2017-08-29 | End: 2017-10-21

## 2017-08-29 RX ADMIN — ONDANSETRON 4 MG: 2 INJECTION INTRAMUSCULAR; INTRAVENOUS at 10:13

## 2017-08-29 RX ADMIN — SODIUM CHLORIDE 1000 ML: 900 INJECTION, SOLUTION INTRAVENOUS at 10:13

## 2017-08-29 NOTE — ED TRIAGE NOTES
Pt brought by EMS. Pt reports severe abdominal cramping starting this morning to lower abdomen/ pelvis radiating to epigastric area. Pt reports she is 6 wks pregnant, . Sepsis Screening completed     (  )Patient meets SIRS criteria. ( X )Patient does not meet SIRS criteria.       SIRS Criteria is achieved when two or more of the following are present   Temperature < 96.8°F (36°C) or > 100.9°F (38.3°C)   Heart Rate > 90 beats per minute   Respiratory Rate > 20 breaths per minute   WBC count > 12,000 or <4,000 or > 10% bands

## 2017-08-29 NOTE — ED PROVIDER NOTES
[address]  EMERGENCY DEPARTMENT HISTORY AND PHYSICAL EXAM       Date: 2017   Patient Name: Ramy Galicia   YOB: 1980  Medical Record Number: [id number]    History of Presenting Illness     Chief Complaint   Patient presents with    Abdominal Pain        History Provided By:  patient    Additional History: 9:16 AM  Ramy Galicia is a 29 y.o. female who is 6 weeks pregnant, Estefani Miller (4 abortions, 2 miscarriages), who presents to the emergency department C/O 10/10 cramping lower abdominal pelvic pain that radiates upwards centrally onset this morning when pt woke up. Associated symptoms include N/V. Reports she has been having small cramps for the past few days, but never this badly. Not followed by ObGYN. Reports she had a positive at home pregnancy test, this pregnancy was unplanned and unwanted. Previously delivered her children at [facility]. Does not know her blood type. Pmhx of herpes, bipolar disorder, and depression. Endorses marijuana use; last was yesterday. LNMP was 17. NKDA. Pt denies vaginal bleeding/discharge, recent injury/fall, diarrhea, hx abdominal surgeries, and any other Sx or complaints. Primary Care Provider: Ute Adams MD   Specialist:    Past History     Past Medical History:   Past Medical History:   Diagnosis Date    Genital herpes     Miscarriage     Psychiatric disorder     anxiety, depression        Past Surgical History:   Past Surgical History:   Procedure Laterality Date    HX OTHER SURGICAL              Family History:   History reviewed. No pertinent family history. Social History:   Social History   Substance Use Topics    Smoking status: Former Smoker     Packs/day: 0.25    Smokeless tobacco: Never Used    Alcohol use Yes      Comment: occasionally         Allergies:   No Known Allergies     Review of Systems   Review of Systems   Constitutional: Negative for fever.    Respiratory: Negative for cough and shortness of breath. Cardiovascular: Negative for chest pain. Gastrointestinal: Positive for abdominal pain, nausea and vomiting. Negative for diarrhea. Genitourinary: Negative for frequency, vaginal bleeding and vaginal discharge. All other systems reviewed and are negative. Physical Exam  Vitals:    08/29/17 0919 08/29/17 1131   BP: 109/55 102/59   Pulse: 81    Resp: 18 18   Temp: 98.3 °F (36.8 °C)    SpO2: 100% 100%   Weight: 65.3 kg (144 lb)    Height: 5' 6\" (1.676 m)        Physical Exam   Constitutional: She is oriented to person, place, and time. She appears well-developed and well-nourished. No distress. HENT:   Head: Normocephalic and atraumatic. Eyes: Conjunctivae and EOM are normal. Pupils are equal, round, and reactive to light. Neck: Normal range of motion. Neck supple. Cardiovascular: Normal rate and regular rhythm. Pulmonary/Chest: Effort normal and breath sounds normal.   Abdominal: Soft. Bowel sounds are normal. She exhibits no distension. There is tenderness in the suprapubic area. There is no rebound, no guarding and no CVA tenderness. Genitourinary: There is no rash on the right labia. Uterus is not tender. Cervix exhibits no motion tenderness. No bleeding in the vagina. Vaginal discharge (thin clear/white) found. Musculoskeletal: Normal range of motion. Neurological: She is alert and oriented to person, place, and time. Skin: Skin is warm and dry. Psychiatric: She has a normal mood and affect. Her behavior is normal.   Nursing note and vitals reviewed.       Diagnostic Study Results     Labs -      Recent Results (from the past 12 hour(s))   CBC WITH AUTOMATED DIFF    Collection Time: 08/29/17  9:45 AM   Result Value Ref Range    WBC 5.1 4.6 - 13.2 K/uL    RBC 4.40 4.20 - 5.30 M/uL    HGB 13.5 12.0 - 16.0 g/dL    HCT 38.8 35.0 - 45.0 %    MCV 88.2 74.0 - 97.0 FL    MCH 30.7 24.0 - 34.0 PG    MCHC 34.8 31.0 - 37.0 g/dL    RDW 12.7 11.6 - 14.5 %    PLATELET 523 135 - 420 K/uL    MPV 10.1 9.2 - 11.8 FL    NEUTROPHILS 62 40 - 73 %    LYMPHOCYTES 30 21 - 52 %    MONOCYTES 6 3 - 10 %    EOSINOPHILS 2 0 - 5 %    BASOPHILS 0 0 - 2 %    ABS. NEUTROPHILS 3.2 1.8 - 8.0 K/UL    ABS. LYMPHOCYTES 1.6 0.9 - 3.6 K/UL    ABS. MONOCYTES 0.3 0.05 - 1.2 K/UL    ABS. EOSINOPHILS 0.1 0.0 - 0.4 K/UL    ABS. BASOPHILS 0.0 0.0 - 0.06 K/UL    DF AUTOMATED     METABOLIC PANEL, COMPREHENSIVE    Collection Time: 08/29/17  9:45 AM   Result Value Ref Range    Sodium 137 136 - 145 mmol/L    Potassium 3.5 3.5 - 5.5 mmol/L    Chloride 103 100 - 108 mmol/L    CO2 24 21 - 32 mmol/L    Anion gap 10 3.0 - 18 mmol/L    Glucose 98 74 - 99 mg/dL    BUN 13 7.0 - 18 MG/DL    Creatinine 0.94 0.6 - 1.3 MG/DL    BUN/Creatinine ratio 14 12 - 20      GFR est AA >60 >60 ml/min/1.73m2    GFR est non-AA >60 >60 ml/min/1.73m2    Calcium 8.8 8.5 - 10.1 MG/DL    Bilirubin, total 0.5 0.2 - 1.0 MG/DL    ALT (SGPT) 20 13 - 56 U/L    AST (SGOT) 18 15 - 37 U/L    Alk.  phosphatase 59 45 - 117 U/L    Protein, total 8.0 6.4 - 8.2 g/dL    Albumin 3.9 3.4 - 5.0 g/dL    Globulin 4.1 (H) 2.0 - 4.0 g/dL    A-G Ratio 1.0 0.8 - 1.7     BETA HCG, QT    Collection Time: 08/29/17  9:45 AM   Result Value Ref [place] [address] (H) 1.0 - 6.0 MIU/ML   HCG URINE, QL. - POC    Collection Time: 08/29/17  9:56 AM   Result Value Ref Range    Pregnancy test,urine (POC) POSITIVE (A) NEG     WET PREP    Collection Time: 08/29/17 10:02 AM   Result Value Ref Range    Special Requests: NO SPECIAL REQUESTS      Wet prep CLUE CELLS PRESENT      Wet prep NO YEAST SEEN      Wet prep NO TRICHOMONAS SEEN     URINALYSIS W/ RFLX MICROSCOPIC    Collection Time: 08/29/17 10:35 AM   Result Value Ref Range    Color YELLOW      Appearance CLEAR      Specific gravity 1.024 1.005 - 1.030      pH (UA) 6.0 5.0 - 8.0      Protein NEGATIVE  NEG mg/dL    Glucose NEGATIVE  NEG mg/dL    Ketone 15 (A) NEG mg/dL    Bilirubin NEGATIVE  NEG      Blood NEGATIVE  NEG Urobilinogen 1.0 0.2 - 1.0 EU/dL    Nitrites NEGATIVE  NEG      Leukocyte Esterase NEGATIVE  NEG         Radiologic Studies -  The following have been ordered and reviewed:   US UTS TRANSVAGINAL OB   Final Result   Impression:      Jeaneth Ip intrauterine pregnancy dating to 6 weeks and 4 days with detectable   fetal heart rate averaging 123 bpm.  Small focus of subchorionic hemorrhage. No   evidence for ectopic pregnancy or other complication. Recommend continued short   interval follow-up beta hCG and pelvic ultrasound until resolution. Medical Decision Making   I am the first provider for this patient. I reviewed the vital signs, available nursing notes, past medical history, past surgical history, family history and social history. Vital Signs-Reviewed the patient's vital signs. Patient Vitals for the past 12 hrs:   Temp Pulse Resp BP SpO2   08/29/17 1131 - - 18 102/59 100 %   08/29/17 0919 98.3 °F (36.8 °C) 81 18 109/55 100 %     Pulse Oximetry Analysis - Normal 100% on room air     Old Medical Records: Nursing notes. Procedures:   Pelvic Exam  Date/Time: 8/29/2017 10:08 AM  Performed by: PA  Type of exam performed: bimanual and speculum. External genitalia appearance: normal.    Vaginal exam:  discharge. The amount of discharge was:  mild. The discharge was clear. Cervical exam:  normal.    Specimen(s) collected:  chlamydia and GC. Bimanual exam:  normal.    Patient tolerance: Patient tolerated the procedure well with no immediate complications        ED Course:  9:16 AM  Initial assessment performed. The patients presenting problems have been discussed, and they are in agreement with the care plan formulated and outlined with them. I have encouraged them to ask questions as they arise throughout their visit.     Pt wishes to wait for any STD tx until cx are resulted    Medications Given in the ED:  Medications   sodium chloride 0.9 % bolus infusion 1,000 mL (0 mL IntraVENous IV Completed 8/29/17 1130)   ondansetron (ZOFRAN) injection 4 mg (4 mg IntraVENous Given 8/29/17 1013)       Discharge Note:  12:11 PM   Pt has been reexamined. Patient has no new complaints, changes, or physical findings. Care plan outlined and precautions discussed. Results were reviewed with the patient. All medications were reviewed with the patient; will d/c home with flagyl and zofran. All of pt's questions and concerns were addressed. Patient was instructed and agrees to follow up with OB, as well as to return to the ED upon further deterioration. Patient is ready to go home. Diagnosis   Clinical Impression:   1. Pelvic pain during pregnancy    2. BV (bacterial vaginosis)    3. Pregnancy with 6 completed weeks gestation           Follow-up Information     Follow up With Details Comments Contact Info    Anni Lopez MD Schedule an appointment as soon as possible for a visit  [place] [phone]  [phone]      [facility] DE[facility]  If symptoms worsen [address] [facility]    [address] [facility]   [address] [address]  [phone]          Current Discharge Medication List      START taking these medications    Details   metroNIDAZOLE (FLAGYL) 500 mg tablet Take 1 Tab by mouth two (2) times a day for 7 days. Qty: 14 Tab, Refills: 0      ondansetron (ZOFRAN ODT) 4 mg disintegrating tablet Take 1 Tab by mouth every eight (8) hours as needed for Nausea. Qty: 20 Tab, Refills: 0             _______________________________   Attestations: This note is prepared by Lizzie Valear, acting as a Scribe for Sj Caraballo PA-C on 9:14 AM on 8/29/2017. Sj Caraballo PA-C: The scribe's documentation has been prepared under my direction and personally reviewed by me in its entirety.   _______________________________

## 2017-08-29 NOTE — DISCHARGE INSTRUCTIONS
Bacterial Vaginosis: Care Instructions  Your Care Instructions    Bacterial vaginosis is a type of vaginal infection. It is caused by excess growth of certain bacteria that are normally found in the vagina. Symptoms can include itching, swelling, pain when you urinate or have sex, and a gray or yellow discharge with a \"fishy\" odor. It is not considered an infection that is spread through sexual contact. Although symptoms can be annoying and uncomfortable, bacterial vaginosis does not usually cause other health problems. However, if you have it while you are pregnant, it can cause complications. While the infection may go away on its own, most doctors use antibiotics to treat it. You may have been prescribed pills or vaginal cream. With treatment, bacterial vaginosis usually clears up in 5 to 7 days. Follow-up care is a key part of your treatment and safety. Be sure to make and go to all appointments, and call your doctor if you are having problems. It's also a good idea to know your test results and keep a list of the medicines you take. How can you care for yourself at home? · Take your antibiotics as directed. Do not stop taking them just because you feel better. You need to take the full course of antibiotics. · Do not eat or drink anything that contains alcohol if you are taking metronidazole (Flagyl). · Keep using your medicine if you start your period. Use pads instead of tampons while using a vaginal cream or suppository. Tampons can absorb the medicine. · Wear loose cotton clothing. Do not wear nylon and other materials that hold body heat and moisture close to the skin. · Do not scratch. Relieve itching with a cold pack or a cool bath. · Do not wash your vaginal area more than once a day. Use plain water or a mild, unscented soap. Do not douche. When should you call for help?   Watch closely for changes in your health, and be sure to contact your doctor if:  · You have unexpected vaginal bleeding. · You have a fever. · You have new or increased pain in your vagina or pelvis. · You are not getting better after 1 week. · Your symptoms return after you finish the course of your medicine. Where can you learn more? Go to http://yuri-eva.info/. Raisa Salazar in the search box to learn more about \"Bacterial Vaginosis: Care Instructions. \"  Current as of: October 13, 2016  Content Version: 11.3  © 9576-5071 [employer]. Care instructions adapted under license by [employer] (which disclaims liability or warranty for this information). If you have questions about a medical condition or this instruction, always ask your healthcare professional. [address] any warranty or liability for your use of this information. Weeks 6 to 10 of Your Pregnancy: Care Instructions  Your Care Instructions    Congratulations on your pregnancy. This is an exciting and important time for you. During the first 6 to 10 weeks of your pregnancy, your body goes through many changes. Your baby grows very fast, even though you cannot feel it yet. You may start to notice that you feel different, both in your body and your emotions. Because each woman's pregnancy is unique, there is no right way to feel. You may feel the healthiest you have ever been, or you may feel tired or sick to your stomach (\"morning sickness\"). These early weeks are a time to make healthy choices and to eat the best foods for you and your baby. This care sheet will give you some ideas. This is also a good time to think about birth defects testing. These are tests done during pregnancy to look for possible problems with the baby. First trimester tests for birth defects can be done between 8 and 17 weeks of pregnancy, depending on the test. Talk with your doctor about what kinds of tests are available. Follow-up care is a key part of your treatment and safety.  Be sure to make and go to all appointments, and call your doctor if you are having problems. It's also a good idea to know your test results and keep a list of the medicines you take. How can you care for yourself at home? Eat well  · Eat at least 3 meals and 2 healthy snacks every day. Eat fresh, whole foods, including:  ¨ 7 or more servings of bread, tortillas, cereal, rice, pasta, or oatmeal.  ¨ 3 or more servings of vegetables, especially leafy green vegetables. ¨ 2 or more servings of fruits. ¨ 3 or more servings of milk, yogurt, or cheese. ¨ 2 or more servings of meat, turkey, chicken, fish, eggs, or dried beans. · Drink plenty of fluids, especially water. Avoid sodas and other sweetened drinks. · Choose foods that have important vitamins for your baby, such as calcium, iron, and folate. ¨ Dairy products, tofu, canned fish with bones, almonds, broccoli, dark leafy greens, corn tortillas, and fortified orange juice are good sources of calcium. ¨ Beef, poultry, liver, spinach, lentils, dried beans, fortified cereals, and dried fruits are rich in iron. ¨ Dark leafy greens, broccoli, asparagus, liver, fortified cereals, orange juice, peanuts, and almonds are good sources of folate. · Avoid foods that could harm your baby. ¨ Do not eat raw or undercooked meat, chicken, or fish (such as sushi or raw oysters). ¨ Do not eat raw eggs or foods that contain raw eggs, such as Caesar dressing. ¨ Do not eat soft cheeses and unpasteurized dairy foods, such as Brie, feta, or blue cheese. ¨ Do not eat fish that contains a lot of mercury, such as shark, swordfish, tilefish, or farrah mackerel. Do not eat more than 6 ounces of tuna each week. ¨ Do not eat raw sprouts, especially alfalfa sprouts. ¨ Cut down on caffeine, such as coffee, tea, and cola. Protect yourself and your baby  · Do not touch bucky litter or cat feces. They can cause an infection that could harm your baby. · High body temperature can be harmful to your baby.  So if you want to use a sauna or hot tub, be sure to talk to your doctor about how to use it safely. [place] with morning sickness  · Sip small amounts of water, juices, or shakes. Try drinking between meals, not with meals. · Eat 5 or 6 small meals a day. Try dry toast or crackers when you first get up, and eat breakfast a little later. · Avoid spicy, greasy, and fatty foods. · When you feel sick, open your windows or go for a short walk to get fresh air. · Try nausea wristbands. These help some women. · Tell your doctor if you think your prenatal vitamins make you sick. Where can you learn more? Go to http://yuri-eva.info/. Enter G112 in the search box to learn more about \"Weeks 6 to 10 of Your Pregnancy: Care Instructions. \"  Current as of: March 16, 2017  Content Version: 11.3  © 6789-0178 [employer]. Care instructions adapted under license by [employer] (which disclaims liability or warranty for this information). If you have questions about a medical condition or this instruction, always ask your healthcare professional. [place] [address] any warranty or liability for your use of this information.

## 2017-08-29 NOTE — LETTER
NOTIFICATION RETURN TO WORK / SCHOOL 
 
8/29/2017 12:09 PM 
 
Ms. Brigitte Cervantes 
[address] [address] To Whom It May Concern: 
 
Delfina Flower is currently under the care of [facility] EMERGENCY DE[facility]. She will return to work 9/1/17 If there are questions or concerns please have the patient contact our office.  
 
 
 
Sincerely, 
 
 
DINESH Ramos

## 2017-08-30 LAB
C TRACH RRNA SPEC QL NAA+PROBE: NEGATIVE
N GONORRHOEA RRNA SPEC QL NAA+PROBE: NEGATIVE
SPECIMEN SOURCE: NORMAL

## 2017-10-21 ENCOUNTER — HOSPITAL ENCOUNTER (EMERGENCY)
Age: 37
Discharge: HOME | End: 2017-10-22
Attending: EMERGENCY MEDICINE
Payer: MEDICAID

## 2017-10-21 DIAGNOSIS — R19.7 DIARRHEA, UNSPECIFIED TYPE: Primary | ICD-10-CM

## 2017-10-21 PROCEDURE — 99283 EMERGENCY DEPT VISIT LOW MDM: CPT

## 2017-10-21 RX ORDER — CIPROFLOXACIN 500 MG/1
500 TABLET ORAL 2 TIMES DAILY
Qty: 6 TAB | Refills: 0 | Status: SHIPPED | OUTPATIENT
Start: 2017-10-21 | End: 2017-10-24

## 2017-10-21 RX ORDER — DIPHENOXYLATE HYDROCHLORIDE AND ATROPINE SULFATE 2.5; .025 MG/1; MG/1
1 TABLET ORAL
Qty: 20 TAB | Refills: 0 | Status: SHIPPED | OUTPATIENT
Start: 2017-10-21 | End: 2018-02-24

## 2017-10-21 RX ORDER — LOPERAMIDE HCL 2 MG
TABLET ORAL
Qty: 16 TAB | Refills: 0 | Status: SHIPPED | OUTPATIENT
Start: 2017-10-21 | End: 2018-02-24

## 2017-10-21 NOTE — LETTER
[facility] [place] 
[facility] EMERGENCY DEPT 
[address] [address] 
[phone] Work/School Note Date: 10/21/2017 To Whom It May concern: 
 
Negra Poag was seen and treated today in the emergency room by the following provider(s): 
Attending Provider: Mateo Horton MD. Negra Poag may return to work when symptoms resolve. Sincerely, 
 
 
 
 
Deangelo Wing.  Daya Thibodeaux MD

## 2017-10-22 VITALS
WEIGHT: 140 LBS | RESPIRATION RATE: 20 BRPM | DIASTOLIC BLOOD PRESSURE: 68 MMHG | OXYGEN SATURATION: 100 % | SYSTOLIC BLOOD PRESSURE: 110 MMHG | BODY MASS INDEX: 22.5 KG/M2 | HEIGHT: 66 IN | TEMPERATURE: 99.1 F | HEART RATE: 84 BPM

## 2017-10-22 NOTE — DISCHARGE INSTRUCTIONS
Diarrhea: Care Instructions  Your Care Instructions    Diarrhea is loose, watery stools (bowel movements). The exact cause is often hard to find. Sometimes diarrhea is your body's way of getting rid of what caused an upset stomach. Viruses, food poisoning, and many medicines can cause diarrhea. Some people get diarrhea in response to emotional stress, anxiety, or certain foods. Almost everyone has diarrhea now and then. It usually isn't serious, and your stools will return to normal soon. The important thing to do is replace the fluids you have lost, so you can prevent dehydration. The doctor has checked you carefully, but problems can develop later. If you notice any problems or new symptoms, get medical treatment right away. Follow-up care is a key part of your treatment and safety. Be sure to make and go to all appointments, and call your doctor if you are having problems. It's also a good idea to know your test results and keep a list of the medicines you take. How can you care for yourself at home? · Watch for signs of dehydration, which means your body has lost too much water. Dehydration is a serious condition and should be treated right away. Signs of dehydration are:  ¨ Increasing thirst and dry eyes and mouth. ¨ Feeling faint or lightheaded. ¨ Darker urine, and a smaller amount of urine than normal.  · To prevent dehydration, drink plenty of fluids, enough so that your urine is light yellow or clear like water. Choose water and other caffeine-free clear liquids until you feel better. If you have kidney, heart, or liver disease and have to limit fluids, talk with your doctor before you increase the amount of fluids you drink. · Begin eating small amounts of mild foods the next day, if you feel like it. ¨ Try yogurt that has live cultures of Lactobacillus. (Check the label.)  ¨ Avoid spicy foods, fruits, alcohol, and caffeine until 48 hours after all symptoms are gone.   ¨ Avoid chewing gum that contains sorbitol. ¨ Avoid dairy products (except for yogurt with Lactobacillus) while you have diarrhea and for 3 days after symptoms are gone. · The doctor may recommend that you take over-the-counter medicine, such as loperamide (Imodium), if you still have diarrhea after 6 hours. Read and follow all instructions on the label. Do not use this medicine if you have bloody diarrhea, a high fever, or other signs of serious illness. Call your doctor if you think you are having a problem with your medicine. When should you call for help? Call 911 anytime you think you may need emergency care. For example, call if:  · You passed out (lost consciousness). · Your stools are maroon or very bloody. Call your doctor now or seek immediate medical care if:  · You are dizzy or lightheaded, or you feel like you may faint. · Your stools are black and look like tar, or they have streaks of blood. · You have new or worse belly pain. · You have symptoms of dehydration, such as:  ¨ Dry eyes and a dry mouth. ¨ Passing only a little dark urine. ¨ Feeling thirstier than usual.  · You have a new or higher fever. Watch closely for changes in your health, and be sure to contact your doctor if:  · Your diarrhea is getting worse. · You see pus in the diarrhea. · You are not getting better after 2 days (48 hours). Where can you learn more? Go to http://yuri-eva.info/. Enter H537 in the search box to learn more about \"Diarrhea: Care Instructions. \"  Current as of: March 20, 2017  Content Version: 11.3  © 4008-9817 [employer]. Care instructions adapted under license by [employer] (which disclaims liability or warranty for this information). If you have questions about a medical condition or this instruction, always ask your healthcare professional. [address] any warranty or liability for your use of this information.

## 2017-10-22 NOTE — ED PROVIDER NOTES
HPI Comments: 10:26 PM   Fredy Espinal is a 29 y.o. female presents to the ED c/o foggy white diarrhea onset a few days ago. Associated sxs include 7/10 abd pain and vomiting. Pt was also concerned about black stool but she took Pepto Bismol prior. NKDA. PMHx includes miscarriage. PSHx includes . SHx includes tobacco use (.25ppd), and occasional EtOH use. FHx of pancreatic CA and colon CA. Pt denies blood in stool, dysuria, decrase urine, PMHx of DM or HTN, PSHx of abd, SHx of drug use recent sick contact and any other symptoms or complaints. Written by EMILI Macario, as dictated by Chan. Morelia Blanco MD     Patient is a 29 y.o. female presenting with diarrhea. The history is provided by the patient. No  was used. Diarrhea    This is a new problem. The current episode started more than 2 days ago. Associated symptoms include diarrhea and vomiting. Pertinent negatives include no dysuria. Past Medical History:   Diagnosis Date    Genital herpes     Miscarriage     Psychiatric disorder     anxiety, depression       Past Surgical History:   Procedure Laterality Date    HX OTHER SURGICAL               History reviewed. No pertinent family history. Social History     Social History    Marital status: SINGLE     Spouse name: N/A    Number of children: N/A    Years of education: N/A     Occupational History    Not on file. Social History Main Topics    Smoking status: Former Smoker     Packs/day: 0.25    Smokeless tobacco: Never Used    Alcohol use Yes      Comment: occasionally     Drug use: Yes     Special: Marijuana, Cocaine      Comment: spice    Sexual activity: Not on file     Other Topics Concern    Not on file     Social History Narrative         ALLERGIES: Review of patient's allergies indicates no known allergies. Review of Systems   Gastrointestinal: Positive for abdominal pain (7/10), diarrhea and vomiting.  Negative for blood in stool. Genitourinary: Negative for decreased urine volume and dysuria. Vitals:    10/21/17 2152   BP: 93/64   Pulse: 83   Resp: 18   Temp: 99.1 °F (37.3 °C)   SpO2: 100%   Weight: 63.5 kg (140 lb)   Height: 5' 6\" (1.676 m)            Physical Exam   Constitutional: She is oriented to person, place, and time. She appears well-developed and well-nourished. Non-toxic appearance. She does not appear ill. No distress. HENT:   Head: Normocephalic and atraumatic. Right Ear: External ear normal.   Left Ear: External ear normal.   Mouth/Throat: Oropharynx is clear and moist. No oropharyngeal exudate. Eyes: Conjunctivae and EOM are normal. Pupils are equal, round, and reactive to light. No scleral icterus. No pallor   Neck: Normal range of motion. Neck supple. No JVD present. No tracheal deviation present. No thyromegaly present. Cardiovascular: Normal rate, regular rhythm and normal heart sounds. Pulmonary/Chest: Effort normal and breath sounds normal. No stridor. No respiratory distress. Abdominal: Soft. Bowel sounds are normal. She exhibits no distension. There is no tenderness. There is no rebound and no guarding. Musculoskeletal: Normal range of motion. She exhibits no edema or tenderness. No soft tissue injuries   Lymphadenopathy:     She has no cervical adenopathy. Neurological: She is alert and oriented to person, place, and time. She has normal reflexes. No cranial nerve deficit. Coordination normal.   Skin: Skin is warm and dry. No rash noted. She is not diaphoretic. No erythema. Psychiatric: She has a normal mood and affect. Her behavior is normal. Judgment and thought content normal.   Nursing note and vitals reviewed.      RESULTS:    CARDIAC MONITOR NOTE:  Cardiac Rhythm: NSR  Rate: 83 bpm     PULSE OXIMETRY NOTE:  Pulse-ox is 100% on RA  Interpretation: normal        No orders to display        Labs Reviewed   POC FECAL OCCULT BLOOD       No results found for this or any previous visit (from the past 12 hour(s)). MDM  Number of Diagnoses or Management Options  Diagnosis management comments: Most likely viral illness. Possible bacterial. Unlikely GI bleed or colon CA. Will perform rectal.     ED Course     MEDICATIONS GIVEN:  Medications - No data to display     Procedures     PROCEDURE NOTE - RECTAL EXAM:   10:40 PM  Performed by: Chan. Federica Menendez MD   Rectal exam performed. Stool was Hemoccult tested, and found to be heme Negative. The procedure took 1-15 minutes, and pt tolerated well. Written by Tyler Aguayo ED Scribe, as dictated by Chna. Federica Menendez MD .       PROGRESS NOTE:  10:26 PM  Initial assessment performed. DISCHARGE NOTE:  11:33 PM   Brigitte Cervantes's  results have been reviewed with her. She has been counseled regarding her diagnosis, treatment, and plan. She verbally conveys understanding and agreement of the signs, symptoms, diagnosis, treatment and prognosis and additionally agrees to follow up as discussed. She also agrees with the care-plan and conveys that all of her questions have been answered. I have also provided discharge instructions for her that include: educational information regarding their diagnosis and treatment, and list of reasons why they would want to return to the ED prior to their follow-up appointment, should her condition change. The patient has been provided with education for proper Emergency Department utilization. CLINICAL IMPRESSION:    1.  Diarrhea, unspecified type        PLAN: DISCHARGE HOME    Follow-up Information     Follow up With Details Comments Contact Deangelo Han MD Schedule an appointment as soon as possible for a visit in 2 days for primary care follow up  [address] [facility] Go to As needed, If symptoms worsen [address] [facility]  [phone]          Current Discharge Medication List      START taking these medications    Details   loperamide (IMODIUM A-D) 2 mg tablet Take 2 tablet initially with first diarrhea bowel movement, and then one tablet with each loose bowel movement after that. Qty: 16 Tab, Refills: 0      diphenoxylate-atropine (LOMOTIL) 2.5-0.025 mg per tablet Take 1 Tab by mouth four (4) times daily as needed for Diarrhea (1 tab after each stool for max 8 per day). Max Daily Amount: 4 Tabs. Take after each stool for a maximum of 8 tablets daily  Indications: for severe diarrhea  Qty: 20 Tab, Refills: 0      ciprofloxacin HCl (CIPRO) 500 mg tablet Take 1 Tab by mouth two (2) times a day for 3 days. Use only if diarrhea does not resolve in three days  Qty: 6 Tab, Refills: 0                 SCRIBE ATTESTATION STATEMENT  Documented by: Marylou Bishop, yonging for and in the presence of Chrissie Nguyen. Amanda Sanford MD      PROVIDER ATTESTATION STATEMENT  Chrissie Nguyen. Amanda Sanford MD  : I personally performed the services described in the documentation, reviewed the documentation, as recorded by the scribe in my presence, and it accurately and completely records my words and actions.

## 2017-10-22 NOTE — ED TRIAGE NOTES
Pt states \" I have had diarrhea and vomiting for a few days with abdominal cramping. Pt states \" I started to worry when I got black stools. Pt states \" I have been taking Pepto Bismol. \"

## 2018-02-24 ENCOUNTER — HOSPITAL ENCOUNTER (EMERGENCY)
Age: 38
Discharge: HOME | End: 2018-02-24
Attending: INTERNAL MEDICINE
Payer: MEDICAID

## 2018-02-24 ENCOUNTER — APPOINTMENT (OUTPATIENT)
Dept: ULTRASOUND IMAGING | Age: 38
End: 2018-02-24
Attending: PHYSICIAN ASSISTANT
Payer: MEDICAID

## 2018-02-24 ENCOUNTER — APPOINTMENT (OUTPATIENT)
Dept: CT IMAGING | Age: 38
End: 2018-02-24
Attending: PHYSICIAN ASSISTANT
Payer: MEDICAID

## 2018-02-24 VITALS
SYSTOLIC BLOOD PRESSURE: 137 MMHG | HEIGHT: 66 IN | DIASTOLIC BLOOD PRESSURE: 92 MMHG | RESPIRATION RATE: 20 BRPM | WEIGHT: 140 LBS | OXYGEN SATURATION: 100 % | HEART RATE: 100 BPM | BODY MASS INDEX: 22.5 KG/M2 | TEMPERATURE: 98.4 F

## 2018-02-24 DIAGNOSIS — B96.89 BV (BACTERIAL VAGINOSIS): ICD-10-CM

## 2018-02-24 DIAGNOSIS — N76.0 BV (BACTERIAL VAGINOSIS): ICD-10-CM

## 2018-02-24 DIAGNOSIS — N83.201 HEMORRHAGIC CYST OF RIGHT OVARY: Primary | ICD-10-CM

## 2018-02-24 DIAGNOSIS — N30.00 ACUTE CYSTITIS WITHOUT HEMATURIA: ICD-10-CM

## 2018-02-24 DIAGNOSIS — A59.9 TRICHOMONAL INFECTION: ICD-10-CM

## 2018-02-24 LAB
ALBUMIN SERPL-MCNC: 3.9 G/DL (ref 3.4–5)
ALBUMIN/GLOB SERPL: 1.1 {RATIO} (ref 0.8–1.7)
ALP SERPL-CCNC: 58 U/L (ref 45–117)
ALT SERPL-CCNC: 22 U/L (ref 13–56)
ANION GAP SERPL CALC-SCNC: 6 MMOL/L (ref 3–18)
APPEARANCE UR: ABNORMAL
AST SERPL-CCNC: 21 U/L (ref 15–37)
BACTERIA URNS QL MICRO: ABNORMAL /HPF
BASOPHILS # BLD: 0 K/UL (ref 0–0.06)
BASOPHILS NFR BLD: 0 % (ref 0–2)
BILIRUB SERPL-MCNC: 0.3 MG/DL (ref 0.2–1)
BILIRUB UR QL: NEGATIVE
BUN SERPL-MCNC: 12 MG/DL (ref 7–18)
BUN/CREAT SERPL: 14 (ref 12–20)
CALCIUM SERPL-MCNC: 8.3 MG/DL (ref 8.5–10.1)
CHLORIDE SERPL-SCNC: 110 MMOL/L (ref 100–108)
CO2 SERPL-SCNC: 28 MMOL/L (ref 21–32)
COLOR UR: YELLOW
CREAT SERPL-MCNC: 0.85 MG/DL (ref 0.6–1.3)
DIFFERENTIAL METHOD BLD: ABNORMAL
EOSINOPHIL # BLD: 0.1 K/UL (ref 0–0.4)
EOSINOPHIL NFR BLD: 1 % (ref 0–5)
EPITH CASTS URNS QL MICRO: ABNORMAL /LPF (ref 0–5)
ERYTHROCYTE [DISTWIDTH] IN BLOOD BY AUTOMATED COUNT: 12.9 % (ref 11.6–14.5)
GLOBULIN SER CALC-MCNC: 3.4 G/DL (ref 2–4)
GLUCOSE SERPL-MCNC: 95 MG/DL (ref 74–99)
GLUCOSE UR STRIP.AUTO-MCNC: NEGATIVE MG/DL
HCG SERPL QL: NEGATIVE
HCT VFR BLD AUTO: 34.7 % (ref 35–45)
HGB BLD-MCNC: 11.6 G/DL (ref 12–16)
HGB UR QL STRIP: NEGATIVE
KETONES UR QL STRIP.AUTO: NEGATIVE MG/DL
LEUKOCYTE ESTERASE UR QL STRIP.AUTO: ABNORMAL
LIPASE SERPL-CCNC: 69 U/L (ref 73–393)
LYMPHOCYTES # BLD: 1 K/UL (ref 0.9–3.6)
LYMPHOCYTES NFR BLD: 17 % (ref 21–52)
MCH RBC QN AUTO: 30 PG (ref 24–34)
MCHC RBC AUTO-ENTMCNC: 33.4 G/DL (ref 31–37)
MCV RBC AUTO: 89.7 FL (ref 74–97)
MONOCYTES # BLD: 0.2 K/UL (ref 0.05–1.2)
MONOCYTES NFR BLD: 4 % (ref 3–10)
MUCOUS THREADS URNS QL MICRO: ABNORMAL /LPF
NEUTS SEG # BLD: 4.5 K/UL (ref 1.8–8)
NEUTS SEG NFR BLD: 78 % (ref 40–73)
NITRITE UR QL STRIP.AUTO: NEGATIVE
PH UR STRIP: 7 [PH] (ref 5–8)
PLATELET # BLD AUTO: 179 K/UL (ref 135–420)
PMV BLD AUTO: 10.8 FL (ref 9.2–11.8)
POTASSIUM SERPL-SCNC: 3.9 MMOL/L (ref 3.5–5.5)
PROT SERPL-MCNC: 7.3 G/DL (ref 6.4–8.2)
PROT UR STRIP-MCNC: NEGATIVE MG/DL
RBC # BLD AUTO: 3.87 M/UL (ref 4.2–5.3)
RBC #/AREA URNS HPF: ABNORMAL /HPF (ref 0–5)
SERVICE CMNT-IMP: NORMAL
SODIUM SERPL-SCNC: 144 MMOL/L (ref 136–145)
SP GR UR REFRACTOMETRY: 1.02 (ref 1–1.03)
TRICHOMONAS UR QL MICRO: ABNORMAL
UROBILINOGEN UR QL STRIP.AUTO: 1 EU/DL (ref 0.2–1)
WBC # BLD AUTO: 5.8 K/UL (ref 4.6–13.2)
WBC URNS QL MICRO: ABNORMAL /HPF (ref 0–5)
WET PREP GENITAL: NORMAL

## 2018-02-24 PROCEDURE — 74011250637 HC RX REV CODE- 250/637: Performed by: PHYSICIAN ASSISTANT

## 2018-02-24 PROCEDURE — 74011250636 HC RX REV CODE- 250/636: Performed by: PHYSICIAN ASSISTANT

## 2018-02-24 PROCEDURE — 80053 COMPREHEN METABOLIC PANEL: CPT | Performed by: PHYSICIAN ASSISTANT

## 2018-02-24 PROCEDURE — 83690 ASSAY OF LIPASE: CPT | Performed by: PHYSICIAN ASSISTANT

## 2018-02-24 PROCEDURE — 96375 TX/PRO/DX INJ NEW DRUG ADDON: CPT

## 2018-02-24 PROCEDURE — 74177 CT ABD & PELVIS W/CONTRAST: CPT

## 2018-02-24 PROCEDURE — 81001 URINALYSIS AUTO W/SCOPE: CPT | Performed by: PHYSICIAN ASSISTANT

## 2018-02-24 PROCEDURE — 76830 TRANSVAGINAL US NON-OB: CPT

## 2018-02-24 PROCEDURE — 74011000250 HC RX REV CODE- 250: Performed by: PHYSICIAN ASSISTANT

## 2018-02-24 PROCEDURE — 96361 HYDRATE IV INFUSION ADD-ON: CPT

## 2018-02-24 PROCEDURE — 96374 THER/PROPH/DIAG INJ IV PUSH: CPT

## 2018-02-24 PROCEDURE — 99284 EMERGENCY DEPT VISIT MOD MDM: CPT

## 2018-02-24 PROCEDURE — 87491 CHLMYD TRACH DNA AMP PROBE: CPT | Performed by: PHYSICIAN ASSISTANT

## 2018-02-24 PROCEDURE — 87210 SMEAR WET MOUNT SALINE/INK: CPT | Performed by: PHYSICIAN ASSISTANT

## 2018-02-24 PROCEDURE — 74011636320 HC RX REV CODE- 636/320: Performed by: INTERNAL MEDICINE

## 2018-02-24 PROCEDURE — 85025 COMPLETE CBC W/AUTO DIFF WBC: CPT | Performed by: PHYSICIAN ASSISTANT

## 2018-02-24 PROCEDURE — 96372 THER/PROPH/DIAG INJ SC/IM: CPT

## 2018-02-24 PROCEDURE — 84703 CHORIONIC GONADOTROPIN ASSAY: CPT | Performed by: PHYSICIAN ASSISTANT

## 2018-02-24 RX ORDER — FLUCONAZOLE 150 MG/1
150 TABLET ORAL
Qty: 1 TAB | Refills: 1 | Status: SHIPPED | OUTPATIENT
Start: 2018-02-24 | End: 2018-02-24

## 2018-02-24 RX ORDER — CEPHALEXIN 500 MG/1
500 CAPSULE ORAL 2 TIMES DAILY
Qty: 14 CAP | Refills: 0 | Status: SHIPPED | OUTPATIENT
Start: 2018-02-24 | End: 2018-03-03

## 2018-02-24 RX ORDER — DOXYCYCLINE 100 MG/1
100 CAPSULE ORAL
Status: COMPLETED | OUTPATIENT
Start: 2018-02-24 | End: 2018-02-24

## 2018-02-24 RX ORDER — METRONIDAZOLE 500 MG/1
500 TABLET ORAL 2 TIMES DAILY
Qty: 14 TAB | Refills: 0 | Status: SHIPPED | OUTPATIENT
Start: 2018-02-24 | End: 2018-03-03

## 2018-02-24 RX ORDER — FENTANYL CITRATE 50 UG/ML
50 INJECTION, SOLUTION INTRAMUSCULAR; INTRAVENOUS
Status: COMPLETED | OUTPATIENT
Start: 2018-02-24 | End: 2018-02-24

## 2018-02-24 RX ORDER — METRONIDAZOLE 250 MG/1
500 TABLET ORAL
Status: COMPLETED | OUTPATIENT
Start: 2018-02-24 | End: 2018-02-24

## 2018-02-24 RX ORDER — ONDANSETRON 2 MG/ML
4 INJECTION INTRAMUSCULAR; INTRAVENOUS
Status: COMPLETED | OUTPATIENT
Start: 2018-02-24 | End: 2018-02-24

## 2018-02-24 RX ORDER — ONDANSETRON 4 MG/1
4 TABLET, ORALLY DISINTEGRATING ORAL
Qty: 20 TAB | Refills: 0 | Status: SHIPPED | OUTPATIENT
Start: 2018-02-24 | End: 2018-07-19

## 2018-02-24 RX ORDER — DOXYCYCLINE 100 MG/1
100 CAPSULE ORAL 2 TIMES DAILY
Qty: 20 CAP | Refills: 0 | Status: SHIPPED | OUTPATIENT
Start: 2018-02-24 | End: 2018-03-06

## 2018-02-24 RX ORDER — HYDROCODONE BITARTRATE AND ACETAMINOPHEN 5; 325 MG/1; MG/1
1-2 TABLET ORAL
Qty: 16 TAB | Refills: 0 | Status: SHIPPED | OUTPATIENT
Start: 2018-02-24 | End: 2018-07-19

## 2018-02-24 RX ADMIN — METRONIDAZOLE 500 MG: 250 TABLET ORAL at 12:58

## 2018-02-24 RX ADMIN — DOXYCYCLINE 100 MG: 100 CAPSULE ORAL at 13:09

## 2018-02-24 RX ADMIN — SODIUM CHLORIDE 1000 ML: 900 INJECTION, SOLUTION INTRAVENOUS at 09:30

## 2018-02-24 RX ADMIN — CEFTRIAXONE SODIUM 250 MG: 250 INJECTION, POWDER, FOR SOLUTION INTRAMUSCULAR; INTRAVENOUS at 12:58

## 2018-02-24 RX ADMIN — ONDANSETRON 4 MG: 2 INJECTION INTRAMUSCULAR; INTRAVENOUS at 12:57

## 2018-02-24 RX ADMIN — FENTANYL CITRATE 50 MCG: 50 INJECTION, SOLUTION INTRAMUSCULAR; INTRAVENOUS at 13:03

## 2018-02-24 RX ADMIN — IOPAMIDOL 100 ML: 612 INJECTION, SOLUTION INTRAVENOUS at 10:46

## 2018-02-24 NOTE — ED NOTES
Assumed care of patient. Patient presents with complaints of lower abdominal pain with nausea and vomiting since 6am this morning, reports 1 episode of diarrhea last night. Patient changed into gown and warm blankets provided. Call bell within reach of patient. Will continue to monitor and assess.

## 2018-02-24 NOTE — ED TRIAGE NOTES
Patient with complaints of lower abdominal pain that started at 0600. Patient reports diarrhea last night  Sepsis Screening completed    (  )Patient meets SIRS criteria. ( x )Patient does not meet SIRS criteria.       SIRS Criteria is achieved when two or more of the following are present   Temperature < 96.8°F (36°C) or > 100.9°F (38.3°C)   Heart Rate > 90 beats per minute   Respiratory Rate > 20 breaths per minute   WBC count > 12,000 or <4,000 or > 10% bands

## 2018-02-24 NOTE — ED PROVIDER NOTES
[address]  EMERGENCY DEPARTMENT HISTORY AND PHYSICAL EXAM    Date: 2/24/2018  Patient Name: Vernon Choudhary  YOB: 1980  Medical Record Number: [id number]     History of Presenting Illness     Chief Complaint   Patient presents with    Abdominal Pain       History Provided By: Patient    Chief Complaint: Abdominal pain  Duration: 2 Hours  Timing:  Acute and Improving  Location: RLQ radiating to the back  Quality: Cramping  Severity: 2 out of 10  Modifying Factors: Relieved by BM  Associated Symptoms: Diarrhea, vaginal discharge, N/V    Additional History (Context):   8:27 AM  Vernon Choudhary is a 40 y.o. female with PMHX genital herpes, BV,  who presents to the emergency department C/O cramping RLQ abdominal pain radiating to the back onset 2 hours ago, waking pt from sleep. Pain was initially 10/10, now 2/10. Reports pain was relieved by BM. Associated symptoms include diarrhea, vaginal discharge, N/V. LNMP was 26 days ago and has regular menstrual cycles. Not sexually active, last intercourse was 3 months ago. Pt denies fever, hx ovarian cysts, hx fibroids, allergies, new sexual partners, exposures to STDs, and any other Sx or complaints. Shx: +former tobacco use, +EtOH use, +illicit drug use (cocaine and marijuana)    PCP: Lesle Lefort, MD    Current Outpatient Prescriptions   Medication Sig Dispense Refill    metroNIDAZOLE (FLAGYL) 500 mg tablet Take 1 Tab by mouth two (2) times a day for 7 days. Next dose in 12 hours. Indications: Bacterial Vaginosis, trichomoniasis 14 Tab 0    doxycycline (MONODOX) 100 mg capsule Take 1 Cap by mouth two (2) times a day for 10 days. Next dose in 12 hours. 20 Cap 0    cephALEXin (KEFLEX) 500 mg capsule Take 1 Cap by mouth two (2) times a day for 7 days.  Indications: BACTERIAL URINARY TRACT INFECTION 14 Cap 0    HYDROcodone-acetaminophen (NORCO) 5-325 mg per tablet Take 1-2 Tabs by mouth every four (4) hours as needed for Pain. Max Daily Amount: 12 Tabs. 16 Tab 0    ondansetron (ZOFRAN ODT) 4 mg disintegrating tablet Take 1 Tab by mouth every eight (8) hours as needed for Nausea (or vomiting.). Allow to dissolve on tongue 20 Tab 0    ACYCLOVIR, BULK, by Does Not Apply route. Past History     Past Medical History:  Past Medical History:   Diagnosis Date    Genital herpes     Miscarriage     Psychiatric disorder     anxiety, depression       Past Surgical History:  Past Surgical History:   Procedure Laterality Date    HX OTHER SURGICAL             Family History:  History reviewed. No pertinent family history. Social History:  Social History   Substance Use Topics    Smoking status: Former Smoker     Packs/day: 0.25    Smokeless tobacco: Never Used    Alcohol use Yes      Comment: occasionally        Allergies:  No Known Allergies      Review of Systems     Review of Systems   Constitutional: Negative for chills and fever. Respiratory: Negative for cough and shortness of breath. Cardiovascular: Negative for chest pain. Gastrointestinal: Positive for abdominal pain, diarrhea, nausea and vomiting. Negative for abdominal distention. Genitourinary: Positive for vaginal discharge. Negative for dysuria, flank pain and vaginal bleeding. Musculoskeletal: Negative for back pain. Skin: Negative for rash. Neurological: Negative for dizziness and light-headedness. All other systems reviewed and are negative. Physical Exam     Vitals:    18 0747   BP: (!) 137/92   Pulse: 100   Resp: 20   Temp: 98.4 °F (36.9 °C)   SpO2: 100%   Weight: 63.5 kg (140 lb)   Height: 5' 6\" (1.676 m)     Physical Exam   Constitutional: She is oriented to person, place, and time. She appears well-developed and well-nourished. No distress. AA female in NAD. Alert. Appears uncomfortable at times. HENT:   Head: Normocephalic and atraumatic.    Right Ear: External ear normal.   Left Ear: External ear normal.   Nose: Nose normal.   Eyes: Conjunctivae are normal. Right eye exhibits no discharge. Left eye exhibits no discharge. Neck: Normal range of motion. Cardiovascular: Normal rate, regular rhythm, normal heart sounds and intact distal pulses. Exam reveals no gallop and no friction rub. No murmur heard. Pulmonary/Chest: Effort normal and breath sounds normal. No accessory muscle usage. No tachypnea. No respiratory distress. She has no decreased breath sounds. She has no wheezes. She has no rhonchi. She has no rales. Abdominal: Normal appearance. She exhibits no ascites and no mass. There is tenderness. There is tenderness at McBurney's point. There is no rigidity, no rebound, no guarding, no CVA tenderness and negative Estrada's sign. Genitourinary:   Genitourinary Comments: Female chaperone in room. See pelvic procedure note. Verbal consent obtained prior to procedure. Musculoskeletal: Normal range of motion. She exhibits no edema or tenderness. Neurological: She is alert and oriented to person, place, and time. Skin: Skin is warm and dry. No rash noted. She is not diaphoretic. No erythema. Psychiatric: She has a normal mood and affect. Judgment normal.   Nursing note and vitals reviewed. Diagnostic Study Results     Labs -     No results found for this or any previous visit (from the past 12 hour(s)). Radiologic Studies -   US TRANSVAGINAL   Final Result   IMPRESSION:   1. Presumed small hemorrhagic cyst in the right ovary with a small amount of free fluid in the pelvis. 2. Benign dystrophic right ovarian calcification, chronic. As read by the radiologist.      CT Results  (Last 48 hours)               02/24/18 1100  CT ABD PELV W CONT Final result    Impression:  IMPRESSION:           1. Mild mesenteric edema with a small amount of simple fluid in the pelvis.    Findings may represent sequela of a ruptured ovarian cyst. There is a   peripherally enhancing 2.1 x 1.3 cm functional right ovarian cyst in the right   adnexa. Consider follow-up pelvic ultrasound as clinically indicated. 2. Otherwise, no acute inflammatory process in the abdomen or pelvis to explain   pain. Narrative:  EXAM: CT of the abdomen and pelvis       INDICATION: Right lower quadrant abdominal pain       COMPARISON: None. TECHNIQUE: Axial CT imaging of the abdomen and pelvis was performed with   intravenous contrast. Multiplanar reformats were generated. One or more dose   reduction techniques were used on this CT: automated exposure control,   adjustment of the mAs and/or kVp according to patient's size, and iterative   reconstruction techniques. The specific techniques utilized on this CT exam have   been documented in the patient's electronic medical record.       _______________       FINDINGS:       LOWER CHEST: Unremarkable. LIVER, BILIARY: Nonspecific subcentimeter hypodensity in the posterior right   hepatic lobe, too small to further characterize. . No biliary dilation. Gallbladder is unremarkable. PANCREAS: Normal.       SPLEEN: Normal.       ADRENALS: Normal.       KIDNEYS: Normal.       LYMPH NODES: No enlarged lymph nodes. GASTROINTESTINAL TRACT: No bowel dilation or wall thickening. PELVIC ORGANS: Poorly defined region of heterogeneous low-attenuation the   anterior body of the uterus, likely representing a fibroid. Calcified phlebolith   noted on the right. There is a functional right ovarian cyst with peripheral   enhancement in the right adnexa. There is a small amount of free fluid in the   mesenteric leaflets of the pelvis and cul-de-sac along with subtle hazy   mesentery including in the upper abdomen adjacent to the transverse colon. VASCULATURE: Unremarkable. BONES: No acute or aggressive osseous abnormalities identified.        OTHER: None.       _______________                 Medications Given in the ED:  Medications   sodium chloride 0.9 % bolus infusion 1,000 mL (0 mL IntraVENous IV Completed 2/24/18 1030)   iopamidol (ISOVUE 300) 61 % contrast injection 100 mL (100 mL IntraVENous Given 2/24/18 1046)   cefTRIAXone (ROCEPHIN) 250 mg in lidocaine (XYLOCAINE) 10 mg/mL (1 %) IM syringe (250 mg IntraMUSCular Given 2/24/18 1258)   doxycycline (MONODOX) capsule 100 mg (100 mg Oral Given 2/24/18 1309)   metroNIDAZOLE (FLAGYL) tablet 500 mg (500 mg Oral Given 2/24/18 1258)   fentaNYL citrate (PF) injection 50 mcg (50 mcg IntraVENous Given 2/24/18 1303)   ondansetron (ZOFRAN) injection 4 mg (4 mg IntraVENous Given 2/24/18 1257)        Medical Decision Making     I am the first provider for this patient. I reviewed the vital signs, available nursing notes, past medical history, past surgical history, family history and social history. Records Reviewed: Nursing Notes    Vital Signs-Reviewed the patient's vital signs. No data found. Provider Notes (Medical Decision Making): appy, colitis, diverticulitis, gastroenteritis, stone, UTI/pyelo, torsion, TOA, cyst.     Pulse Oximetry Analysis - Normal 100% on RA       Procedures:  Pelvic Exam  Date/Time: 2/24/2018 9:59 AM  Performed by: PA  Procedure duration:  15 minutes. Exam assisted by:  Female chaperone in room. Type of exam performed: bimanual and speculum. Vaginal exam:  discharge. The amount of discharge was:  scant. The discharge was white. Cervical exam:  normal.    Specimen(s) collected:  chlamydia, GC and vaginal culture. Bimanual exam:  right adenexal tenderness. Patient tolerance: Patient tolerated the procedure well with no immediate complications              ED Course:   8:27 AM Initial assessment performed. The patients presenting problems have been discussed, and they are in agreement with the care plan formulated and outlined with them. Offering no questions or concerns at this time. Diagnosis and Disposition     No evidence of appendicitis.  R ovarian cyst without torsion or TOA. + trich. Will tx for PID as well. Will await cultures. Close GYN FU. Reasons to RTED discussed with pt. All questions answered. Pt feels comfortable going home at this time. Pt expressed understanding and she agrees with plan. Discharge Note:  1:54 PM  Brigitte Cervantes's  results have been reviewed with her. She has been counseled regarding her diagnosis, treatment, and plan. She verbally conveys understanding and agreement of the signs, symptoms, diagnosis, treatment and prognosis and additionally agrees to follow up as discussed. She also agrees with the care-plan and conveys that all of her questions have been answered. I have also provided discharge instructions for her that include: educational information regarding their diagnosis and treatment, and list of reasons why they would want to return to the ED prior to their follow-up appointment, should her condition change. She has been provided with education for proper emergency department utilization. Clinical Impression:    1. Hemorrhagic cyst of right ovary    2. Acute cystitis without hematuria    3. Trichomonal infection    4. BV (bacterial vaginosis)        PLAN:  1. D/C Home  2. Discharge Medication List as of 2/24/2018  1:55 PM      START taking these medications    Details   metroNIDAZOLE (FLAGYL) 500 mg tablet Take 1 Tab by mouth two (2) times a day for 7 days. Next dose in 12 hours. Indications: Bacterial Vaginosis, trichomoniasis, Print, Disp-14 Tab, R-0      doxycycline (MONODOX) 100 mg capsule Take 1 Cap by mouth two (2) times a day for 10 days. Next dose in 12 hours. , Print, Disp-20 Cap, R-0      cephALEXin (KEFLEX) 500 mg capsule Take 1 Cap by mouth two (2) times a day for 7 days. Indications: BACTERIAL URINARY TRACT INFECTION, Print, Disp-14 Cap, R-0      fluconazole (DIFLUCAN) 150 mg tablet Take 1 Tab by mouth now for 1 dose.  Indications: Vulvovaginal Candidiasis, Print, Disp-1 Tab, R-1      HYDROcodone-acetaminophen ([place]) 5-325 mg per tablet Take 1-2 Tabs by mouth every four (4) hours as needed for Pain. Max Daily Amount: 12 Tabs., Print, Disp-16 Tab, R-0      ondansetron (ZOFRAN ODT) 4 mg disintegrating tablet Take 1 Tab by mouth every eight (8) hours as needed for Nausea (or vomiting.). Allow to dissolve on tongue, Print, Disp-20 Tab, R-0         CONTINUE these medications which have NOT CHANGED    Details   ACYCLOVIR, BULK, by Does Not Apply route., Historical Med           3. Follow-up Information     Follow up With Details Comments [facility] [place] Schedule an appointment as soon as possible for a visit For follow up with OBGYN [address] [facility] [address] [address] [facility]      [facility] EMERGENCY DEPT Go to As needed, if symptoms worsen [address] [place] [address]  [phone]        _______________________________    Attestations: This note is prepared by Eugenio Hargrove, acting as Scribe for Js Duffy PA-C. Js Duffy PA-C:  The scribe's documentation has been prepared under my direction and personally reviewed by me in its entirety.   I confirm that the note above accurately reflects all work, treatment, procedures, and medical decision making performed by me.  _______________________________

## 2018-02-24 NOTE — DISCHARGE INSTRUCTIONS
Bacterial Vaginosis: Care Instructions  Your Care Instructions    Bacterial vaginosis is a type of vaginal infection. It is caused by excess growth of certain bacteria that are normally found in the vagina. Symptoms can include itching, swelling, pain when you urinate or have sex, and a gray or yellow discharge with a \"fishy\" odor. It is not considered an infection that is spread through sexual contact. Although symptoms can be annoying and uncomfortable, bacterial vaginosis does not usually cause other health problems. However, if you have it while you are pregnant, it can cause complications. While the infection may go away on its own, most doctors use antibiotics to treat it. You may have been prescribed pills or vaginal cream. With treatment, bacterial vaginosis usually clears up in 5 to 7 days. Follow-up care is a key part of your treatment and safety. Be sure to make and go to all appointments, and call your doctor if you are having problems. It's also a good idea to know your test results and keep a list of the medicines you take. How can you care for yourself at home? · Take your antibiotics as directed. Do not stop taking them just because you feel better. You need to take the full course of antibiotics. · Do not eat or drink anything that contains alcohol if you are taking metronidazole (Flagyl). · Keep using your medicine if you start your period. Use pads instead of tampons while using a vaginal cream or suppository. Tampons can absorb the medicine. · Wear loose cotton clothing. Do not wear nylon and other materials that hold body heat and moisture close to the skin. · Do not scratch. Relieve itching with a cold pack or a cool bath. · Do not wash your vaginal area more than once a day. Use plain water or a mild, unscented soap. Do not douche. When should you call for help?   Watch closely for changes in your health, and be sure to contact your doctor if:  ? · You have unexpected vaginal bleeding. ? · You have a fever. ? · You have new or increased pain in your vagina or pelvis. ? · You are not getting better after 1 week. ? · Your symptoms return after you finish the course of your medicine. Where can you learn more? Go to http://yuri-eva.info/. Verito Ponce in the search box to learn more about \"Bacterial Vaginosis: Care Instructions. \"  Current as of: October 13, 2016  Content Version: 11.4  © 9588-6186 [employer]. Care instructions adapted under license by [employer] (which disclaims liability or warranty for this information). If you have questions about a medical condition or this instruction, always ask your healthcare professional. [address] any warranty or liability for your use of this information. Functional Ovarian Cyst: Care Instructions  Your Care Instructions    A functional ovarian cyst is a sac that forms on the surface of a woman's ovary during ovulation. The sac holds a maturing egg. Usually the sac goes away after the egg is released. But if the egg is not released, or if the sac closes up after the egg is released, the sac can swell up with fluid and form a cyst.  Functional ovarian cysts are different than ovarian growths caused by other problems, such as cancer. Most functional ovarian cysts cause no symptoms and go away on their own. Some cause mild pain. Others can cause severe pain when they rupture or bleed. Follow-up care is a key part of your treatment and safety. Be sure to make and go to all appointments, and call your doctor if you are having problems. It's also a good idea to know your test results and keep a list of the medicines you take. How can you care for yourself at home? · Use heat, such as a hot water bottle, a heating pad set on low, or a warm bath, to relax tense muscles and relieve cramping. · Be safe with medicines.  Take pain medicines exactly as directed. ¨ If the doctor gave you a prescription medicine for pain, take it as prescribed. ¨ If you are not taking a prescription pain medicine, ask your doctor if you can take an over-the-counter medicine. · Avoid constipation. Make sure you drink enough fluids and include fruits, vegetables, and fiber in your diet each day. Constipation does not cause ovarian cysts, but it may make your pelvic pain worse. When should you call for help? Call your doctor now or seek immediate medical care if:  ? · You have severe vaginal bleeding. ? · You have new or worse belly or pelvic pain. ? Watch closely for changes in your health, and be sure to contact your doctor if:  ? · You have unusual vaginal bleeding. ? · You do not get better as expected. Where can you learn more? Go to http://yuri-eva.info/. Enter L655 in the search box to learn more about \"Functional Ovarian Cyst: Care Instructions. \"  Current as of: October 13, 2016  Content Version: 11.4  © 2041-3849 [employer]. Care instructions adapted under license by [employer] (which disclaims liability or warranty for this information). If you have questions about a medical condition or this instruction, always ask your healthcare professional. [place] [address] any warranty or liability for your use of this information. Urinary Tract Infection in Women: Care Instructions  Your Care Instructions    A urinary tract infection, or UTI, is a general term for an infection anywhere between the kidneys and the urethra (where urine comes out). Most UTIs are bladder infections. They often cause pain or burning when you urinate. UTIs are caused by bacteria and can be cured with antibiotics. Be sure to complete your treatment so that the infection goes away. Follow-up care is a key part of your treatment and safety.  Be sure to make and go to all appointments, and call your doctor if you are having problems. It's also a good idea to know your test results and keep a list of the medicines you take. How can you care for yourself at home? · Take your antibiotics as directed. Do not stop taking them just because you feel better. You need to take the full course of antibiotics. · Drink extra water and other fluids for the next day or two. This may help wash out the bacteria that are causing the infection. (If you have kidney, heart, or liver disease and have to limit fluids, talk with your doctor before you increase your fluid intake.)  · Avoid drinks that are carbonated or have caffeine. They can irritate the bladder. · Urinate often. Try to empty your bladder each time. · To relieve pain, take a hot bath or lay a heating pad set on low over your lower belly or genital area. Never go to sleep with a heating pad in place. To prevent UTIs  · Drink plenty of water each day. This helps you urinate often, which clears bacteria from your system. (If you have kidney, heart, or liver disease and have to limit fluids, talk with your doctor before you increase your fluid intake.)  · Urinate when you need to. · Urinate right after you have sex. · Change sanitary pads often. · Avoid douches, bubble baths, feminine hygiene sprays, and other feminine hygiene products that have deodorants. · After going to the bathroom, wipe from front to back. When should you call for help? Call your doctor now or seek immediate medical care if:  ? · Symptoms such as fever, chills, nausea, or vomiting get worse or appear for the first time. ? · You have new pain in your back just below your rib cage. This is called flank pain. ? · There is new blood or pus in your urine. ? · You have any problems with your antibiotic medicine. ? Watch closely for changes in your health, and be sure to contact your doctor if:  ? · You are not getting better after taking an antibiotic for 2 days. ? · Your symptoms go away but then come back. Where can you learn more? Go to http://yuri-eva.info/. Enter N048 in the search box to learn more about \"Urinary Tract Infection in Women: Care Instructions. \"  Current as of: May 12, 2017  Content Version: 11.4  © 6587-8483 [email or web address]. Care instructions adapted under license by [employer] (which disclaims liability or warranty for this information). If you have questions about a medical condition or this instruction, always ask your healthcare professional. [address] any warranty or liability for your use of this information. Trichomoniasis: Care Instructions  Your Care Instructions  Trichomoniasis is a sexually transmitted infection (STI) that is spread by having sex with an infected partner. Trichomoniasis is commonly called trich (say \"trick\"). In women, trich may cause vaginal itching and a smelly discharge. But in many cases, especially in men, there are no symptoms. [place] is treated so that you do not spread it to others. Both you and your sex partner or partners should be treated at the same time so you do not infect each other again. Trich may cause problems with pregnancy. Your doctor will talk with you about treatment for Trich if you are pregnant. Follow-up care is a key part of your treatment and safety. Be sure to make and go to all appointments, and call your doctor if you are having problems. It's also a good idea to know your test results and keep a list of the medicines you take. How can you care for yourself at home? · Take your antibiotics as directed. Do not stop taking them just because you feel better. You need to take the full course of antibiotics. · Do not have sex while you are being treated. If your doctor gave you a single dose of antibiotics, do not have sex for one week after being treated and until your partner also has been treated.   · Tell your sex partner (or partners) that he or she will also need to be tested and treated. · Use a cold water compress or cool baths to relieve itching. To prevent trichomoniasis in the future  · Use latex condoms every time you have sex. Use them from the beginning to the end of sexual contact. · Talk to your partner before having sex. Find out if he or she has or is at risk for trich or any other STI. Keep in mind that a person may be able to spread an STI even if he or she does not have symptoms. · Do not have sex if you are being treated for trich or any other STI. · Do not have sex with anyone who has symptoms of an STI, such as sores on the genitals or mouth. · Having one sex partner (who does not have STIs and does not have sex with anyone else) is a good way to avoid STIs. When should you call for help? Call your doctor now or seek immediate medical care if:  ? · You have unusual vaginal bleeding. ? · You have a fever. ? · You have new discharge from the vagina or penis. ? · You have pelvic pain. ? Watch closely for changes in your health, and be sure to contact your doctor if:  ? · You do not get better as expected. ? · You have any new symptoms or your symptoms get worse. Where can you learn more? Go to http://yuri-eva.info/. Enter N590 in the search box to learn more about \"Trichomoniasis: Care Instructions. \"  Current as of: March 20, 2017  Content Version: 11.4  © 5579-5908 [employer]. Care instructions adapted under license by [employer] (which disclaims liability or warranty for this information). If you have questions about a medical condition or this instruction, always ask your healthcare professional. [address] any warranty or liability for your use of this information.

## 2018-02-24 NOTE — LETTER
[facility] [place] 
THE [facility] EMERGENCY DEPT 
[place] [id number] 
[phone] Work Note Date: 2/24/2018 To Whom It May concern: 
 
Paola Nicole was seen and treated today in the emergency room by the following provider(s): 
Physician Assistant: Vaibhav Phan PA-C. Paola Nicole may return to work on 2/27/18. Sincerely, Alex Gonzalez PA-C

## 2018-07-19 ENCOUNTER — HOSPITAL ENCOUNTER (EMERGENCY)
Age: 38
Discharge: HOME | End: 2018-07-19
Attending: EMERGENCY MEDICINE
Payer: MEDICAID

## 2018-07-19 VITALS
HEART RATE: 74 BPM | TEMPERATURE: 98.6 F | BODY MASS INDEX: 22.5 KG/M2 | RESPIRATION RATE: 16 BRPM | DIASTOLIC BLOOD PRESSURE: 66 MMHG | HEIGHT: 66 IN | OXYGEN SATURATION: 98 % | SYSTOLIC BLOOD PRESSURE: 113 MMHG | WEIGHT: 140 LBS

## 2018-07-19 DIAGNOSIS — L23.9 ALLERGIC CONTACT DERMATITIS, UNSPECIFIED TRIGGER: Primary | ICD-10-CM

## 2018-07-19 DIAGNOSIS — Z11.3 SCREEN FOR STD (SEXUALLY TRANSMITTED DISEASE): ICD-10-CM

## 2018-07-19 DIAGNOSIS — B96.89 BV (BACTERIAL VAGINOSIS): ICD-10-CM

## 2018-07-19 DIAGNOSIS — N76.0 BV (BACTERIAL VAGINOSIS): ICD-10-CM

## 2018-07-19 LAB
APPEARANCE UR: CLEAR
BILIRUB UR QL: NEGATIVE
COLOR UR: YELLOW
GLUCOSE UR STRIP.AUTO-MCNC: NEGATIVE MG/DL
HCG UR QL: NEGATIVE
HGB UR QL STRIP: NEGATIVE
KETONES UR QL STRIP.AUTO: NEGATIVE MG/DL
LEUKOCYTE ESTERASE UR QL STRIP.AUTO: NEGATIVE
NITRITE UR QL STRIP.AUTO: NEGATIVE
PH UR STRIP: 6.5 [PH] (ref 5–8)
PROT UR STRIP-MCNC: NEGATIVE MG/DL
SERVICE CMNT-IMP: NORMAL
SP GR UR REFRACTOMETRY: 1.02 (ref 1–1.03)
UROBILINOGEN UR QL STRIP.AUTO: 0.2 EU/DL (ref 0.2–1)
WET PREP GENITAL: NORMAL

## 2018-07-19 PROCEDURE — 87210 SMEAR WET MOUNT SALINE/INK: CPT | Performed by: PHYSICIAN ASSISTANT

## 2018-07-19 PROCEDURE — 99283 EMERGENCY DEPT VISIT LOW MDM: CPT

## 2018-07-19 PROCEDURE — 81003 URINALYSIS AUTO W/O SCOPE: CPT | Performed by: EMERGENCY MEDICINE

## 2018-07-19 PROCEDURE — 87491 CHLMYD TRACH DNA AMP PROBE: CPT | Performed by: PHYSICIAN ASSISTANT

## 2018-07-19 PROCEDURE — 81025 URINE PREGNANCY TEST: CPT

## 2018-07-19 RX ORDER — HYDROXYZINE 50 MG/1
50 TABLET, FILM COATED ORAL
Qty: 20 TAB | Refills: 0 | Status: SHIPPED | OUTPATIENT
Start: 2018-07-19 | End: 2018-07-29

## 2018-07-19 RX ORDER — PREDNISONE 10 MG/1
TABLET ORAL
Qty: 21 TAB | Refills: 0 | Status: SHIPPED | OUTPATIENT
Start: 2018-07-19 | End: 2020-03-09

## 2018-07-19 RX ORDER — METRONIDAZOLE 500 MG/1
500 TABLET ORAL 2 TIMES DAILY
Qty: 14 TAB | Refills: 0 | Status: SHIPPED | OUTPATIENT
Start: 2018-07-19 | End: 2018-07-26

## 2018-07-19 NOTE — ED TRIAGE NOTES
C/o facial rash and swelling for approx 5 days after using a new soap, nausea, right sided abd pain, vaginal discharge for 2 weeks--states she wants to be tested for STD's. Also wants to be see for rash to right lower leg for a few months.

## 2018-07-19 NOTE — DISCHARGE INSTRUCTIONS
Bacterial Vaginosis: Care Instructions  Your Care Instructions    Bacterial vaginosis is a type of vaginal infection. It is caused by excess growth of certain bacteria that are normally found in the vagina. Symptoms can include itching, swelling, pain when you urinate or have sex, and a gray or yellow discharge with a \"fishy\" odor. It is not considered an infection that is spread through sexual contact. Although symptoms can be annoying and uncomfortable, bacterial vaginosis does not usually cause other health problems. However, if you have it while you are pregnant, it can cause complications. While the infection may go away on its own, most doctors use antibiotics to treat it. You may have been prescribed pills or vaginal cream. With treatment, bacterial vaginosis usually clears up in 5 to 7 days. Follow-up care is a key part of your treatment and safety. Be sure to make and go to all appointments, and call your doctor if you are having problems. It's also a good idea to know your test results and keep a list of the medicines you take. How can you care for yourself at home? · Take your antibiotics as directed. Do not stop taking them just because you feel better. You need to take the full course of antibiotics. · Do not eat or drink anything that contains alcohol if you are taking metronidazole (Flagyl). · Keep using your medicine if you start your period. Use pads instead of tampons while using a vaginal cream or suppository. Tampons can absorb the medicine. · Wear loose cotton clothing. Do not wear nylon and other materials that hold body heat and moisture close to the skin. · Do not scratch. Relieve itching with a cold pack or a cool bath. · Do not wash your vaginal area more than once a day. Use plain water or a mild, unscented soap. Do not douche. When should you call for help?   Watch closely for changes in your health, and be sure to contact your doctor if:    · You have unexpected vaginal bleeding.     · You have a fever.     · You have new or increased pain in your vagina or pelvis.     · You are not getting better after 1 week.     · Your symptoms return after you finish the course of your medicine. Where can you learn more? Go to http://yuri-eva.info/. Mayra Carcamo in the search box to learn more about \"Bacterial Vaginosis: Care Instructions. \"  Current as of: October 6, 2017  Content Version: 11.7  © 2961-8450 [employer]. Care instructions adapted under license by [employer] (which disclaims liability or warranty for this information). If you have questions about a medical condition or this instruction, always ask your healthcare professional. [address] any warranty or liability for your use of this information. Dermatitis: Care Instructions  Your Care Instructions  Dermatitis is the general name used for any rash or inflammation of the skin. Different kinds of dermatitis cause different kinds of rashes. Common causes of a rash include new medicines, plants (such as poison oak or poison ivy), heat, and stress. Certain illnesses can also cause a rash. An allergic reaction to something that touches your skin, such as latex, nickel, or poison ivy, is called contact dermatitis. Contact dermatitis may also be caused by something that irritates the skin, such as bleach, a chemical, or soap. These types of rashes cannot be spread from person to person. How long your rash will last depends on what caused it. Rashes may last a few days or months. Follow-up care is a key part of your treatment and safety. Be sure to make and go to all appointments, and call your doctor if you are having problems. It's also a good idea to know your test results and keep a list of the medicines you take. How can you care for yourself at home? · Do not scratch the rash. Cut your nails short, and file them smooth.  Or wear gloves if this helps keep you from scratching. · Wash the area with water only. Pat dry. · Put cold, wet cloths on the rash to reduce itching. · Keep cool, and stay out of the sun. · Leave the rash open to the air as much as possible. · If the rash itches, use hydrocortisone cream. Follow the directions on the label. Calamine lotion may help for plant rashes. · Take an over-the-counter antihistamine, such as diphenhydramine (Benadryl) or loratadine (Claritin), to help calm the itching. Read and follow all instructions on the label. · If your doctor prescribed a cream, use it as directed. If your doctor prescribed medicine, take it exactly as directed. When should you call for help? Call your doctor now or seek immediate medical care if:    · You have symptoms of infection, such as:  ¨ Increased pain, swelling, warmth, or redness. ¨ Red streaks leading from the area. ¨ Pus draining from the area. ¨ A fever.     · You have joint pain along with the rash.    Watch closely for changes in your health, and be sure to contact your doctor if:    · Your rash is changing or getting worse.     · You are not getting better as expected. Where can you learn more? Go to http://yuri-eva.info/. Enter [phone] in the search box to learn more about \"Dermatitis: Care Instructions. \"  Current as of: October 5, 2017  Content Version: 11.7  © 1991-2265 [employer]. Care instructions adapted under license by [employer] (which disclaims liability or warranty for this information). If you have questions about a medical condition or this instruction, always ask your healthcare professional. [place] [address] any warranty or liability for your use of this information.

## 2018-07-19 NOTE — ED PROVIDER NOTES
EMERGENCY DEPARTMENT HISTORY AND PHYSICAL EXAM    Date: 2018  Patient Name: John Kuhn    History of Presenting Illness     Chief Complaint   Patient presents with    Rash         History Provided By: Patient    Chief Complaint: facial rash  Duration: 5 Days  Timing:  Worsening  Location: face  Quality: urticarial  Modifying Factors: Benadryl without relief  Other Symptoms: nausea, right sided abdominal pain, vaginal discharge, localized area of thickened, pruritic skin to the medial aspect of the right lower leg    Additional History (Context):   11:46 AM   John Kuhn is a 40 y.o. female with PMHX of anxiety who presents to the emergency department C/O gradually worsening facial urticarial rash and swelling which is slightly pruritic starting 5 days ago after switching to a new soap. Pt has taken Benadryl without relief. Other sxs include nausea, RLQ abdominal pain, and vaginal discharge starting 1.5 weeks ago. Pt would like to be tested for STDs, stating she has been with a new sexual partner. Pt additionally c/o a localized area of thickened, pruritic skin to the medial aspect of the right lower leg for \"a few months\". Pt states she had a menstrual period last month, but does not remember when it was. Pt denies fever, vomiting, and any other sxs or complaints. PCP: None        Past History     Past Medical History:  Past Medical History:   Diagnosis Date    Genital herpes     Gonorrhea     Miscarriage     Psychiatric disorder     anxiety, depression       Past Surgical History:  Past Surgical History:   Procedure Laterality Date    HX OTHER SURGICAL             Family History:  No family history on file.     Social History:  Social History   Substance Use Topics    Smoking status: Former Smoker     Packs/day: 0.25    Smokeless tobacco: Never Used    Alcohol use Yes      Comment: occasionally        Allergies:  No Known Allergies      Review of Systems   Review of Systems Constitutional: Negative for fever. Gastrointestinal: Positive for abdominal pain (RLQ) and nausea. Negative for vomiting. Genitourinary: Positive for vaginal discharge. Negative for dysuria. Skin: Positive for rash (facial rash, right lower leg (medial)). All other systems reviewed and are negative. Physical Exam     Vitals:    07/19/18 1141   BP: 113/66   Pulse: 74   Resp: 16   Temp: 98.6 °F (37 °C)   SpO2: 98%   Weight: 63.5 kg (140 lb)   Height: 5' 6\" (1.676 m)     Physical Exam   Constitutional: She is oriented to person, place, and time. She appears well-developed and well-nourished. No distress. HENT:   Head: Normocephalic and atraumatic. Eyes: Conjunctivae and EOM are normal. Pupils are equal, round, and reactive to light. Neck: Normal range of motion. Neck supple. Cardiovascular: Normal rate and regular rhythm. Pulmonary/Chest: Effort normal and breath sounds normal.   Abdominal: Soft. Bowel sounds are normal. She exhibits no distension. There is no tenderness. There is no rebound and no guarding. NO ttp, specifically no RLQ abd tenderness   Genitourinary: Uterus is not tender. Cervix exhibits no motion tenderness. No bleeding in the vagina. Vaginal discharge (thin whiite, odorous ) found. Musculoskeletal: Normal range of motion. Neurological: She is alert and oriented to person, place, and time. Skin: Skin is warm and dry. Rash noted. Psychiatric: She has a normal mood and affect. Her behavior is normal.   Nursing note and vitals reviewed.         Diagnostic Study Results     Labs -     Recent Results (from the past 12 hour(s))   URINALYSIS W/ RFLX MICROSCOPIC    Collection Time: 07/19/18 11:45 AM   Result Value Ref Range    Color YELLOW      Appearance CLEAR      Specific gravity 1.024 1.005 - 1.030      pH (UA) 6.5 5.0 - 8.0      Protein NEGATIVE  NEG mg/dL    Glucose NEGATIVE  NEG mg/dL    Ketone NEGATIVE  NEG mg/dL    Bilirubin NEGATIVE  NEG      Blood NEGATIVE  NEG Urobilinogen 0.2 0.2 - 1.0 EU/dL    Nitrites NEGATIVE  NEG      Leukocyte Esterase NEGATIVE  NEG     WET PREP    Collection Time: 07/19/18 12:15 PM   Result Value Ref Range    Special Requests: NO SPECIAL REQUESTS      Wet prep CLUE CELLS PRESENT      Wet prep NO YEAST SEEN      Wet prep NO TRICHOMONAS SEEN     HCG URINE, QL. - POC    Collection Time: 07/19/18 12:23 PM   Result Value Ref Range    Pregnancy test,urine (POC) NEGATIVE  NEG         Radiologic Studies -   No orders to display     CT Results  (Last 48 hours)    None        CXR Results  (Last 48 hours)    None          Medications given in the ED-  Medications - No data to display      Medical Decision Making   I am the first provider for this patient. I reviewed the vital signs, available nursing notes, past medical history, past surgical history, family history and social history. Vital Signs-Reviewed the patient's vital signs. Pulse Oximetry Analysis - 98% on room air     Records Reviewed: Nursing Notes and Old Medical Records    Procedures:  Pelvic Exam  Date/Time: 7/19/2018 12:23 PM  Performed by: PA  Procedure duration:  5 minutes. Exam assisted by:  Santiago Frias ED tech. Type of exam performed: bimanual and speculum. External genitalia appearance: normal.    Vaginal exam:  discharge. The amount of discharge was:  mild. The discharge was thin and gray. Cervical exam:  normal.    Specimen(s) collected:  chlamydia, GC and vaginal culture. Bimanual exam:  normal.    Patient tolerance: Patient tolerated the procedure well with no immediate complications          ED Course:   11:46 AM Initial assessment performed. The patients presenting problems have been discussed, and they are in agreement with the care plan formulated and outlined with them. I have encouraged them to ask questions as they arise throughout their visit. Pt here for STD screen & rash.  Pt reports RLQ abd pain but NO ttp on exam & symptoms for 1-2 weeks, no fever & benign abd exam. Pt likely std but wishes to wait for results before tx. Strict return precautions expressed. Diagnosis and Disposition       DISCHARGE NOTE:  12:56 PM   Brigitte Cervantes's  results have been reviewed with her. She has been counseled regarding her diagnosis, treatment, and plan. She verbally conveys understanding and agreement of the signs, symptoms, diagnosis, treatment and prognosis and additionally agrees to follow up as discussed. She also agrees with the care-plan and conveys that all of her questions have been answered. I have also provided discharge instructions for her that include: educational information regarding their diagnosis and treatment, and list of reasons why they would want to return to the ED prior to their follow-up appointment, should her condition change. She has been provided with education for proper emergency department utilization. CLINICAL IMPRESSION:    1. Allergic contact dermatitis, unspecified trigger    2. Screen for STD (sexually transmitted disease)    3. BV (bacterial vaginosis)        PLAN:  1. D/C Home  2. Current Discharge Medication List      START taking these medications    Details   metroNIDAZOLE (FLAGYL) 500 mg tablet Take 1 Tab by mouth two (2) times a day for 7 days. Qty: 14 Tab, Refills: 0      hydrOXYzine HCl (ATARAX) 50 mg tablet Take 1 Tab by mouth every six (6) hours as needed for Itching for up to 10 days. Qty: 20 Tab, Refills: 0      predniSONE (STERAPRED DS) 10 mg dose pack Use as directed  Qty: 21 Tab, Refills: 0           3. Follow-up Information     Follow up With Details Comments [address] [facility],  Schedule an appointment as soon as possible for a visit For Primary Care Follow Up [address] [facility] [facility] to For STD Follow Up Grayson Howe.   Gema Dennis [address]    [facility] Go to If symptoms worsen, As needed [address] [id number]  [phone]        _______________________________    Attestations: This note is prepared by Epi Grimm, acting as Scribe for Stephon Fuentes PA-C. Stephon Fuentes PA-C:  The scribe's documentation has been prepared under my direction and personally reviewed by me in its entirety.   I confirm that the note above accurately reflects all work, treatment, procedures, and medical decision making performed by me.  _______________________________

## 2018-10-08 ENCOUNTER — APPOINTMENT (OUTPATIENT)
Dept: GENERAL RADIOLOGY | Age: 38
End: 2018-10-08
Attending: PHYSICIAN ASSISTANT
Payer: MEDICAID

## 2018-10-08 ENCOUNTER — HOSPITAL ENCOUNTER (EMERGENCY)
Age: 38
Discharge: PSYCHIATRIC OR SUBSTANCE USE FACILITY | End: 2018-10-09
Attending: EMERGENCY MEDICINE | Admitting: EMERGENCY MEDICINE
Payer: MEDICAID

## 2018-10-08 DIAGNOSIS — R45.851 SUICIDAL IDEATION: ICD-10-CM

## 2018-10-08 DIAGNOSIS — F14.90 CRACK COCAINE USE: Primary | ICD-10-CM

## 2018-10-08 DIAGNOSIS — F10.10 ALCOHOL ABUSE: ICD-10-CM

## 2018-10-08 DIAGNOSIS — F12.10 MARIJUANA ABUSE: ICD-10-CM

## 2018-10-08 LAB
ALBUMIN SERPL-MCNC: 4.2 G/DL (ref 3.4–5)
ALBUMIN/GLOB SERPL: 1 {RATIO} (ref 0.8–1.7)
ALP SERPL-CCNC: 53 U/L (ref 45–117)
ALT SERPL-CCNC: 18 U/L (ref 13–56)
AMPHET UR QL SCN: NEGATIVE
ANION GAP SERPL CALC-SCNC: 12 MMOL/L (ref 3–18)
APAP SERPL-MCNC: <2 UG/ML (ref 10–30)
APPEARANCE UR: CLEAR
AST SERPL-CCNC: 18 U/L (ref 15–37)
BARBITURATES UR QL SCN: NEGATIVE
BASOPHILS # BLD: 0 K/UL (ref 0–0.1)
BASOPHILS NFR BLD: 0 % (ref 0–2)
BENZODIAZ UR QL: NEGATIVE
BILIRUB SERPL-MCNC: 0.3 MG/DL (ref 0.2–1)
BILIRUB UR QL: NEGATIVE
BUN SERPL-MCNC: 7 MG/DL (ref 7–18)
BUN/CREAT SERPL: 9 (ref 12–20)
CALCIUM SERPL-MCNC: 8.9 MG/DL (ref 8.5–10.1)
CANNABINOIDS UR QL SCN: POSITIVE
CHLORIDE SERPL-SCNC: 106 MMOL/L (ref 100–108)
CK MB CFR SERPL CALC: 0.8 % (ref 0–4)
CK MB SERPL-MCNC: 1 NG/ML (ref 5–25)
CK SERPL-CCNC: 123 U/L (ref 26–192)
CO2 SERPL-SCNC: 25 MMOL/L (ref 21–32)
COCAINE UR QL SCN: POSITIVE
COLOR UR: YELLOW
CREAT SERPL-MCNC: 0.82 MG/DL (ref 0.6–1.3)
DIFFERENTIAL METHOD BLD: ABNORMAL
EOSINOPHIL # BLD: 0 K/UL (ref 0–0.4)
EOSINOPHIL NFR BLD: 0 % (ref 0–5)
ERYTHROCYTE [DISTWIDTH] IN BLOOD BY AUTOMATED COUNT: 12.8 % (ref 11.6–14.5)
ETHANOL SERPL-MCNC: 88 MG/DL (ref 0–3)
GLOBULIN SER CALC-MCNC: 4.3 G/DL (ref 2–4)
GLUCOSE SERPL-MCNC: 86 MG/DL (ref 74–99)
GLUCOSE UR STRIP.AUTO-MCNC: NEGATIVE MG/DL
HCG UR QL: NEGATIVE
HCT VFR BLD AUTO: 36.4 % (ref 35–45)
HDSCOM,HDSCOM: ABNORMAL
HGB BLD-MCNC: 12.4 G/DL (ref 12–16)
HGB UR QL STRIP: NEGATIVE
KETONES UR QL STRIP.AUTO: NEGATIVE MG/DL
LEUKOCYTE ESTERASE UR QL STRIP.AUTO: NEGATIVE
LYMPHOCYTES # BLD: 1.5 K/UL (ref 0.9–3.6)
LYMPHOCYTES NFR BLD: 51 % (ref 21–52)
MCH RBC QN AUTO: 30.2 PG (ref 24–34)
MCHC RBC AUTO-ENTMCNC: 34.1 G/DL (ref 31–37)
MCV RBC AUTO: 88.6 FL (ref 74–97)
METHADONE UR QL: NEGATIVE
MONOCYTES # BLD: 0.2 K/UL (ref 0.05–1.2)
MONOCYTES NFR BLD: 7 % (ref 3–10)
NEUTS SEG # BLD: 1.2 K/UL (ref 1.8–8)
NEUTS SEG NFR BLD: 42 % (ref 40–73)
NITRITE UR QL STRIP.AUTO: NEGATIVE
OPIATES UR QL: NEGATIVE
PCP UR QL: NEGATIVE
PH UR STRIP: 6 [PH] (ref 5–8)
PLATELET # BLD AUTO: 242 K/UL (ref 135–420)
PMV BLD AUTO: 10.4 FL (ref 9.2–11.8)
POTASSIUM SERPL-SCNC: 3.6 MMOL/L (ref 3.5–5.5)
PROT SERPL-MCNC: 8.5 G/DL (ref 6.4–8.2)
PROT UR STRIP-MCNC: NEGATIVE MG/DL
RBC # BLD AUTO: 4.11 M/UL (ref 4.2–5.3)
SALICYLATES SERPL-MCNC: <2.8 MG/DL (ref 2.8–20)
SODIUM SERPL-SCNC: 143 MMOL/L (ref 136–145)
SP GR UR REFRACTOMETRY: 1.02 (ref 1–1.03)
TROPONIN I SERPL-MCNC: <0.02 NG/ML (ref 0–0.06)
UROBILINOGEN UR QL STRIP.AUTO: 0.2 EU/DL (ref 0.2–1)
WBC # BLD AUTO: 2.9 K/UL (ref 4.6–13.2)

## 2018-10-08 PROCEDURE — 80307 DRUG TEST PRSMV CHEM ANLYZR: CPT | Performed by: PHYSICIAN ASSISTANT

## 2018-10-08 PROCEDURE — 85025 COMPLETE CBC W/AUTO DIFF WBC: CPT | Performed by: PHYSICIAN ASSISTANT

## 2018-10-08 PROCEDURE — 81003 URINALYSIS AUTO W/O SCOPE: CPT | Performed by: PHYSICIAN ASSISTANT

## 2018-10-08 PROCEDURE — 93005 ELECTROCARDIOGRAM TRACING: CPT

## 2018-10-08 PROCEDURE — 96374 THER/PROPH/DIAG INJ IV PUSH: CPT

## 2018-10-08 PROCEDURE — 80053 COMPREHEN METABOLIC PANEL: CPT | Performed by: PHYSICIAN ASSISTANT

## 2018-10-08 PROCEDURE — 81025 URINE PREGNANCY TEST: CPT

## 2018-10-08 PROCEDURE — 71045 X-RAY EXAM CHEST 1 VIEW: CPT

## 2018-10-08 PROCEDURE — 74011250636 HC RX REV CODE- 250/636: Performed by: PHYSICIAN ASSISTANT

## 2018-10-08 PROCEDURE — 82550 ASSAY OF CK (CPK): CPT | Performed by: PHYSICIAN ASSISTANT

## 2018-10-08 PROCEDURE — 99285 EMERGENCY DEPT VISIT HI MDM: CPT

## 2018-10-08 PROCEDURE — 74011250637 HC RX REV CODE- 250/637: Performed by: PHYSICIAN ASSISTANT

## 2018-10-08 RX ORDER — ONDANSETRON 2 MG/ML
4 INJECTION INTRAMUSCULAR; INTRAVENOUS
Status: COMPLETED | OUTPATIENT
Start: 2018-10-08 | End: 2018-10-08

## 2018-10-08 RX ORDER — ACETAMINOPHEN 325 MG/1
975 TABLET ORAL
Status: COMPLETED | OUTPATIENT
Start: 2018-10-08 | End: 2018-10-08

## 2018-10-08 RX ADMIN — ONDANSETRON 4 MG: 2 INJECTION INTRAMUSCULAR; INTRAVENOUS at 23:30

## 2018-10-08 RX ADMIN — ACETAMINOPHEN 975 MG: 325 TABLET ORAL at 23:30

## 2018-10-08 NOTE — ED PROVIDER NOTES
EMERGENCY DEPARTMENT HISTORY AND PHYSICAL EXAM 
 
Date: 10/8/2018 Patient Name: Siobhan Sen History of Presenting Illness Chief Complaint Patient presents with  Mental Health Problem  Addiction problem History Provided By: Patient Chief Complaint: Mental health problem Duration: 12 Hours Timing:  Acute Modifying Factors: Pt has been using Cocaine,  
Associated Symptoms: SI (plan was to overdose), CP (resolved), SOB (resolved), headache Additional History (Context):  
6:14 PM 
Siobhan Sen is a 40 y.o. female with PMHX of Herpes who presents to the emergency department C/O mental health problem onset 6 hours ago. Associated sxs include SI (plan to overdose), CP (resolved), SOB (resolved), headache. Pt states she tried to overdose this morning on crack, she felt that she was trying to take enough to kill herself but woke up at 1 hour ago after passing out. Pt states she has been using crack cocaine, marijuana, EtOH, Tramadol and Vicodin and has been unable to stop on her own, has been using 4-5 times a week and has been doing so for 10 years. Pt notes a few years ago she felt the same as she did right now, and went to [facility] for a week, followed up for a little while and then stopped going. Pt notes she drinks every other day, 2-3 beers. Pt was on Clonopin and Prozac previously for depression. Pt states she last drank this morning. Pt's LNMP was 1 month ago and was normal and she denies concern for pregnancy. Pt denies IV drug use, HI, hallucinations and any other sxs or complaints. PCP: None Current Outpatient Prescriptions Medication Sig Dispense Refill  predniSONE (STERAPRED DS) 10 mg dose pack Use as directed 21 Tab 0 Past History Past Medical History: 
Past Medical History:  
Diagnosis Date  Genital herpes  Gonorrhea  Miscarriage  Psychiatric disorder   
 anxiety, depression Past Surgical History: Past Surgical History:  
Procedure Laterality Date  HX OTHER SURGICAL    
  Family History: 
History reviewed. No pertinent family history. Social History: 
Social History Substance Use Topics  Smoking status: Former Smoker Packs/day: 0.25  Smokeless tobacco: Never Used  Alcohol use Yes Comment: occasionally Allergies: 
No Known Allergies Review of Systems Review of Systems Respiratory: Positive for shortness of breath (resolved). Cardiovascular: Positive for chest pain (resolved). Neurological: Positive for headaches. Psychiatric/Behavioral: Positive for dysphoric mood and suicidal ideas. Negative for hallucinations. (-) HI All other systems reviewed and are negative. Physical Exam  
 
Vitals:  
 10/09/18 0500 10/09/18 0700 10/09/18 1058 10/09/18 1100 BP: 113/66 102/53 110/70 104/67 Pulse: 72 74 73 71 Resp: 15 10  21 Temp:  98.7 °F (37.1 °C) SpO2:   100% 100% Weight:      
Height:      
 
Physical Exam  
Constitutional: She is oriented to person, place, and time. She appears well-developed and well-nourished. No distress. Sitting up on stretcher, oriented x4, tearful on exam   
HENT:  
Head: Normocephalic and atraumatic. Right Ear: External ear normal.  
Left Ear: External ear normal.  
Nose: Nose normal.  
Mouth/Throat: Oropharynx is clear and moist.  
Eyes: EOM are normal. Pupils are equal, round, and reactive to light. Neck: Normal range of motion. Neck supple. Cardiovascular: Normal rate, regular rhythm, normal heart sounds and intact distal pulses. No murmur heard. Pulmonary/Chest: Effort normal and breath sounds normal. No respiratory distress. She has no wheezes. She has no rales. Abdominal: Soft. Bowel sounds are normal. There is no tenderness. Neurological: She is alert and oriented to person, place, and time. Skin: Skin is warm and dry. Psychiatric: She has a normal mood and affect. Judgment normal.  
Nursing note and vitals reviewed. Diagnostic Study Results Labs - No results found for this or any previous visit (from the past 12 hour(s)). Radiologic Studies -  
XR CHEST PORT Final Result CT Results  (Last 48 hours) None CXR Results  (Last 48 hours) None Medications given in the ED- Medications  
ondansetron (ZOFRAN) injection 4 mg (4 mg IntraVENous Given 10/8/18 2330)  
acetaminophen (TYLENOL) tablet 975 mg (975 mg Oral Given 10/8/18 2330)  
ibuprofen (MOTRIN) tablet 800 mg (800 mg Oral Given 10/9/18 0140) cloNIDine HCl (CATAPRES) tablet 0.1 mg (0.1 mg Oral Given 10/9/18 0237)  
ibuprofen (MOTRIN) tablet 800 mg (800 mg Oral Given 10/9/18 1005)  
acetaminophen (TYLENOL) tablet 1,000 mg (1,000 mg Oral Given 10/9/18 1005) LORazepam (ATIVAN) tablet 1 mg (1 mg Oral Given 10/9/18 1006) Medical Decision Making I am the first provider for this patient. I reviewed the vital signs, available nursing notes, past medical history, past surgical history, family history and social history. Vital Signs-Reviewed the patient's vital signs. Pulse Oximetry Analysis - 100% on Room Air EKG interpretation: (Preliminary) 6:52 PM  
71 BPM, NSR with sinus arrhythmia with normal respirations, no STEMI, similar to old on 3/21/2011 EKG read by Archie Ga PA-C at 6:52 PM 
 
 
Records Reviewed: Nursing Notes and Old Medical Records Provider Notes (Medical Decision Making): Pt with a hx of crack cociane, etoh and marijuana abuse presents complaining of SI, plan to overdose, reporting a syncopal episode today after smoking crack. She is not complaining of any CP or SOB at present. Exam reassuring. Workup negative, except UDS. Will consult [facility] for crisis evaluation. Procedures: 
Procedures ED Course:  
6:14 PM  
Initial assessment performed.  The patients presenting problems have been discussed, and they are in agreement with the care plan formulated and outlined with them. I have encouraged them to ask questions as they arise throughout their visit. CONSULT NOTE:  
8:00 PM 
Hermilo Avitia PA-C spoke with Alec Brown from [facility] Discussed pt's hx, disposition, and available diagnostic and imaging results  over the telephone. Reviewed care plans. She will come evaluate pt. 
 
9:55 PM 
[facility] is here to evaluate pt. 
 
11:44 PM 
Informed by Alec Brown that pt is voluntary. SIGN OUT: 
10/9/2018 2:00 AM 
Patient's presentation, labs/imaging and plan of care was reviewed with Nils Bishop DO as part of sign out. They will wait for placement as part of the plan discussed with the patient. Nils Bishop DO's assistance in completion of this plan is greatly appreciated but it should be noted that I will be the provider of record for this patient. 
-Hermilo Avitia PA-C. SIGN OUT: 
10/9/2018 7:11 AM 
Patient's presentation, labs/imaging and plan of care was reviewed with Macie Day MD as part of sign out. They will await placement as part of the plan discussed with the patient. Macie Day MD's assistance in completion of this plan is greatly appreciated but it should be noted that Hermilo Avitia PA-C will be the provider of record for this patient. Kings Martinez, ED Scribe for Nils Bishop DO 
  
10:45 AM Received call from [facility], the patient has been accepted for transfer by Lore Cotton MD to [facility].  
 
Diagnosis and Disposition TRANSFER PROGRESS NOTE: 
 
10:45 AM  
Discussed impending transfer with Patient and/or family. Pt and/or family instructed that Pt would be transferred to [facility].  Discussed reasoning for transfer and future treatment plan. Family and Pt understands and agrees with care plan. Labs Reviewed CBC WITH AUTOMATED DIFF - Abnormal; Notable for the following:   
    Result Value WBC 2.9 (*)   
 RBC 4.11 (*)   
 ABS. NEUTROPHILS 1.2 (*) All other components within normal limits METABOLIC PANEL, COMPREHENSIVE - Abnormal; Notable for the following: BUN/Creatinine ratio 9 (*) Protein, total 8.5 (*) Globulin 4.3 (*) All other components within normal limits ETHYL ALCOHOL - Abnormal; Notable for the following:   
 ALCOHOL(ETHYL),SERUM 88 (*) All other components within normal limits SALICYLATE - Abnormal; Notable for the following:   
 Salicylate level <7.1 (*) All other components within normal limits ACETAMINOPHEN - Abnormal; Notable for the following:   
 Acetaminophen level <2 (*) All other components within normal limits DRUG SCREEN, URINE - Abnormal; Notable for the following:   
 THC (TH-CANNABINOL) POSITIVE (*) COCAINE POSITIVE (*) All other components within normal limits URINALYSIS W/ RFLX MICROSCOPIC CARDIAC PANEL,(CK, CKMB & TROPONIN) HCG URINE, QL. - POC Recent Results (from the past 12 hour(s)) CBC WITH AUTOMATED DIFF Collection Time: 10/08/18  6:38 PM  
Result Value Ref Range WBC 2.9 (L) 4.6 - 13.2 K/uL  
 RBC 4.11 (L) 4.20 - 5.30 M/uL  
 HGB 12.4 12.0 - 16.0 g/dL HCT 36.4 35.0 - 45.0 % MCV 88.6 74.0 - 97.0 FL  
 MCH 30.2 24.0 - 34.0 PG  
 MCHC 34.1 31.0 - 37.0 g/dL  
 RDW 12.8 11.6 - 14.5 % PLATELET 248 431 - 170 K/uL MPV 10.4 9.2 - 11.8 FL  
 NEUTROPHILS 42 40 - 73 % LYMPHOCYTES 51 21 - 52 % MONOCYTES 7 3 - 10 % EOSINOPHILS 0 0 - 5 % BASOPHILS 0 0 - 2 %  
 ABS. NEUTROPHILS 1.2 (L) 1.8 - 8.0 K/UL  
 ABS. LYMPHOCYTES 1.5 0.9 - 3.6 K/UL  
 ABS. MONOCYTES 0.2 0.05 - 1.2 K/UL  
 ABS. EOSINOPHILS 0.0 0.0 - 0.4 K/UL  
 ABS. BASOPHILS 0.0 0.0 - 0.1 K/UL  
 DF AUTOMATED METABOLIC PANEL, COMPREHENSIVE Collection Time: 10/08/18  6:38 PM  
Result Value Ref Range Sodium 143 136 - 145 mmol/L Potassium 3.6 3.5 - 5.5 mmol/L  Chloride 106 100 - 108 mmol/L  
 CO2 25 21 - 32 mmol/L  
 Anion gap 12 3.0 - 18 mmol/L Glucose 86 74 - 99 mg/dL BUN 7 7.0 - 18 MG/DL Creatinine 0.82 0.6 - 1.3 MG/DL  
 BUN/Creatinine ratio 9 (L) 12 - 20 GFR est AA >60 >60 ml/min/1.73m2 GFR est non-AA >60 >60 ml/min/1.73m2 Calcium 8.9 8.5 - 10.1 MG/DL Bilirubin, total 0.3 0.2 - 1.0 MG/DL  
 ALT (SGPT) 18 13 - 56 U/L  
 AST (SGOT) 18 15 - 37 U/L Alk. phosphatase 53 45 - 117 U/L Protein, total 8.5 (H) 6.4 - 8.2 g/dL Albumin 4.2 3.4 - 5.0 g/dL Globulin 4.3 (H) 2.0 - 4.0 g/dL A-G Ratio 1.0 0.8 - 1.7 ETHYL ALCOHOL Collection Time: 10/08/18  6:38 PM  
Result Value Ref Range ALCOHOL(ETHYL),SERUM 88 (H) 0 - 3 MG/DL  
SALICYLATE Collection Time: 10/08/18  6:38 PM  
Result Value Ref Range Salicylate level <2.6 (L) 2.8 - 20 MG/DL  
ACETAMINOPHEN Collection Time: 10/08/18  6:38 PM  
Result Value Ref Range Acetaminophen level <2 (L) 10.0 - 30.0 ug/mL CARDIAC PANEL,(CK, CKMB & TROPONIN) Collection Time: 10/08/18  6:38 PM  
Result Value Ref Range  26 - 192 U/L  
 CK - MB 1.0 <3.6 ng/ml CK-MB Index 0.8 0.0 - 4.0 % Troponin-I, Qt. <0.02 0.00 - 0.06 NG/ML  
EKG, 12 LEAD, INITIAL Collection Time: 10/08/18  6:52 PM  
Result Value Ref Range Ventricular Rate 71 BPM  
 Atrial Rate 71 BPM  
 P-R Interval 134 ms QRS Duration 82 ms Q-T Interval 396 ms QTC Calculation (Bezet) 430 ms Calculated P Axis 42 degrees Calculated R Axis 73 degrees Calculated T Axis 64 degrees Diagnosis Normal sinus rhythm with sinus arrhythmia Normal ECG URINALYSIS W/ RFLX MICROSCOPIC Collection Time: 10/08/18  6:56 PM  
Result Value Ref Range Color YELLOW Appearance CLEAR Specific gravity 1.016 1.005 - 1.030    
 pH (UA) 6.0 5.0 - 8.0 Protein NEGATIVE  NEG mg/dL Glucose NEGATIVE  NEG mg/dL Ketone NEGATIVE  NEG mg/dL Bilirubin NEGATIVE  NEG Blood NEGATIVE  NEG Urobilinogen 0.2 0.2 - 1.0 EU/dL Nitrites NEGATIVE  NEG Leukocyte Esterase NEGATIVE  NEG    
DRUG SCREEN, URINE Collection Time: 10/08/18  6:56 PM  
Result Value Ref Range BENZODIAZEPINES NEGATIVE  NEG    
 BARBITURATES NEGATIVE  NEG    
 THC (TH-CANNABINOL) POSITIVE (A) NEG    
 OPIATES NEGATIVE  NEG    
 PCP(PHENCYCLIDINE) NEGATIVE  NEG    
 COCAINE POSITIVE (A) NEG    
 AMPHETAMINES NEGATIVE  NEG METHADONE NEGATIVE  NEG HDSCOM (NOTE) HCG URINE, QL. - POC Collection Time: 10/08/18  7:20 PM  
Result Value Ref Range Pregnancy test,urine (POC) NEGATIVE  NEG    
 
 
CLINICAL IMPRESSION 1. Crack cocaine use 2. Suicidal ideation 3. Alcohol abuse 4. Marijuana abuse PLAN: 
1. Transfer to [facility] 
_______________________________ Attestations: This note is prepared by Eva Porter, acting as Scribe for Archie Ga PA-C. Archie Ga PA-C:  The scribe's documentation has been prepared under my direction and personally reviewed by me in its entirety. I confirm that the note above accurately reflects all work, treatment, procedures, and medical decision making performed by me. This note is prepared by Pasquale Casiano, acting as Scribe for General Richard DO . General Richard DO: The scribe's documentation has been prepared under my direction and personally reviewed by me in its entirety. I confirm that the note above accurately reflects all work, treatment, procedures, and medical decision making performed by me. This note is prepared by Pasquale Casiano, acting as Scribe for Jonas Mcclendon MD. Jonas Mcclendon MD: The scribe's documentation has been prepared under my direction and personally reviewed by me in its entirety. I confirm that the note above accurately reflects all work, treatment, procedures, and medical decision making performed by me.   
 
_______________________________

## 2018-10-09 VITALS
HEART RATE: 71 BPM | OXYGEN SATURATION: 100 % | HEIGHT: 66 IN | SYSTOLIC BLOOD PRESSURE: 104 MMHG | WEIGHT: 135 LBS | TEMPERATURE: 98.7 F | DIASTOLIC BLOOD PRESSURE: 67 MMHG | RESPIRATION RATE: 21 BRPM | BODY MASS INDEX: 21.69 KG/M2

## 2018-10-09 PROCEDURE — 74011250637 HC RX REV CODE- 250/637: Performed by: PHYSICIAN ASSISTANT

## 2018-10-09 PROCEDURE — 74011250637 HC RX REV CODE- 250/637: Performed by: EMERGENCY MEDICINE

## 2018-10-09 RX ORDER — CLONIDINE HYDROCHLORIDE 0.1 MG/1
0.1 TABLET ORAL
Status: COMPLETED | OUTPATIENT
Start: 2018-10-09 | End: 2018-10-09

## 2018-10-09 RX ORDER — LORAZEPAM 1 MG/1
1 TABLET ORAL
Status: COMPLETED | OUTPATIENT
Start: 2018-10-09 | End: 2018-10-09

## 2018-10-09 RX ORDER — IBUPROFEN 400 MG/1
800 TABLET ORAL
Status: COMPLETED | OUTPATIENT
Start: 2018-10-09 | End: 2018-10-09

## 2018-10-09 RX ORDER — IBUPROFEN 400 MG/1
800 TABLET ORAL ONCE
Status: COMPLETED | OUTPATIENT
Start: 2018-10-09 | End: 2018-10-09

## 2018-10-09 RX ORDER — ACETAMINOPHEN 500 MG
1000 TABLET ORAL ONCE
Status: COMPLETED | OUTPATIENT
Start: 2018-10-09 | End: 2018-10-09

## 2018-10-09 RX ADMIN — ACETAMINOPHEN 1000 MG: 500 TABLET, FILM COATED ORAL at 10:05

## 2018-10-09 RX ADMIN — CLONIDINE HYDROCHLORIDE 0.1 MG: 0.1 TABLET ORAL at 02:37

## 2018-10-09 RX ADMIN — IBUPROFEN 800 MG: 400 TABLET ORAL at 10:05

## 2018-10-09 RX ADMIN — IBUPROFEN 800 MG: 400 TABLET ORAL at 01:40

## 2018-10-09 RX ADMIN — LORAZEPAM 1 MG: 1 TABLET ORAL at 10:06

## 2018-10-09 NOTE — ED NOTES
Report given to [facility], pt resting on stretcher, sitter at bedside, bed in low position, no needs expressed at this time.  Waiting on bed assignment,

## 2018-10-09 NOTE — ED NOTES
Assumed care of pt;  Pt in NAD;  Pt medicated for nausea and h/a per order;  Call bell within reach; sitter within eye site;  Vss;  Pt voices no other concerns or questions at this time

## 2018-10-09 NOTE — ED NOTES
Report received from [address], pt resting on stretcher, appears to be sleeping, pt woke up while assessing pt, bed in low position, call bell within reach, side rails up. Pt has no needs at this time, breakfast menu given to pt. Sitter at bedside.

## 2018-10-09 NOTE — ED NOTES
Breakfast at bedside, pt resting on stretcher, call bell within reach, side rails up, sitter at bedside, no needs expressed at this time

## 2018-10-09 NOTE — ED NOTES
Attempted to call report staff at [address] they will return call shortly after verifying room #

## 2018-10-09 NOTE — ED NOTES
Pt c/o headache and feeling nervous/anxious, pt requesting meds, Dr. Vinny Mendiola aware, pt resting on stretcher, call bell within reach, side rails up, sitter at bedside

## 2018-11-25 LAB
ATRIAL RATE: 71 BPM
CALCULATED P AXIS, ECG09: 42 DEGREES
CALCULATED R AXIS, ECG10: 73 DEGREES
CALCULATED T AXIS, ECG11: 64 DEGREES
DIAGNOSIS, 93000: NORMAL
P-R INTERVAL, ECG05: 134 MS
Q-T INTERVAL, ECG07: 396 MS
QRS DURATION, ECG06: 82 MS
QTC CALCULATION (BEZET), ECG08: 430 MS
VENTRICULAR RATE, ECG03: 71 BPM

## 2020-03-09 ENCOUNTER — HOSPITAL ENCOUNTER (EMERGENCY)
Age: 40
Discharge: HOME | End: 2020-03-09
Attending: EMERGENCY MEDICINE
Payer: MEDICAID

## 2020-03-09 VITALS
SYSTOLIC BLOOD PRESSURE: 120 MMHG | TEMPERATURE: 98.6 F | WEIGHT: 140 LBS | HEART RATE: 90 BPM | BODY MASS INDEX: 22.5 KG/M2 | OXYGEN SATURATION: 100 % | HEIGHT: 66 IN | DIASTOLIC BLOOD PRESSURE: 85 MMHG | RESPIRATION RATE: 18 BRPM

## 2020-03-09 DIAGNOSIS — R11.2 NON-INTRACTABLE VOMITING WITH NAUSEA, UNSPECIFIED VOMITING TYPE: Primary | ICD-10-CM

## 2020-03-09 LAB
ALBUMIN SERPL-MCNC: 4.5 G/DL (ref 3.4–5)
ALBUMIN/GLOB SERPL: 1.1 {RATIO} (ref 0.8–1.7)
ALP SERPL-CCNC: 70 U/L (ref 45–117)
ALT SERPL-CCNC: 18 U/L (ref 13–56)
ANION GAP SERPL CALC-SCNC: 9 MMOL/L (ref 3–18)
APPEARANCE UR: ABNORMAL
AST SERPL-CCNC: 21 U/L (ref 10–38)
BACTERIA URNS QL MICRO: ABNORMAL /HPF
BASOPHILS # BLD: 0 K/UL (ref 0–0.1)
BASOPHILS NFR BLD: 0 % (ref 0–2)
BILIRUB SERPL-MCNC: 0.6 MG/DL (ref 0.2–1)
BILIRUB UR QL: NEGATIVE
BUN SERPL-MCNC: 12 MG/DL (ref 7–18)
BUN/CREAT SERPL: 15 (ref 12–20)
CALCIUM SERPL-MCNC: 9.3 MG/DL (ref 8.5–10.1)
CHLORIDE SERPL-SCNC: 110 MMOL/L (ref 100–111)
CO2 SERPL-SCNC: 24 MMOL/L (ref 21–32)
COLOR UR: YELLOW
CREAT SERPL-MCNC: 0.81 MG/DL (ref 0.6–1.3)
DIFFERENTIAL METHOD BLD: ABNORMAL
EOSINOPHIL # BLD: 0 K/UL (ref 0–0.4)
EOSINOPHIL NFR BLD: 0 % (ref 0–5)
EPITH CASTS URNS QL MICRO: ABNORMAL /LPF (ref 0–5)
ERYTHROCYTE [DISTWIDTH] IN BLOOD BY AUTOMATED COUNT: 13.8 % (ref 11.6–14.5)
GLOBULIN SER CALC-MCNC: 4.1 G/DL (ref 2–4)
GLUCOSE SERPL-MCNC: 87 MG/DL (ref 74–99)
GLUCOSE UR STRIP.AUTO-MCNC: NEGATIVE MG/DL
HCT VFR BLD AUTO: 40.1 % (ref 35–45)
HGB BLD-MCNC: 13.6 G/DL (ref 12–16)
HGB UR QL STRIP: NEGATIVE
KETONES UR QL STRIP.AUTO: NEGATIVE MG/DL
LEUKOCYTE ESTERASE UR QL STRIP.AUTO: NEGATIVE
LIPASE SERPL-CCNC: 71 U/L (ref 73–393)
LYMPHOCYTES # BLD: 1.3 K/UL (ref 0.9–3.6)
LYMPHOCYTES NFR BLD: 18 % (ref 21–52)
MCH RBC QN AUTO: 30.5 PG (ref 24–34)
MCHC RBC AUTO-ENTMCNC: 33.9 G/DL (ref 31–37)
MCV RBC AUTO: 89.9 FL (ref 74–97)
MONOCYTES # BLD: 0.3 K/UL (ref 0.05–1.2)
MONOCYTES NFR BLD: 4 % (ref 3–10)
MUCOUS THREADS URNS QL MICRO: ABNORMAL /LPF
NEUTS SEG # BLD: 5.6 K/UL (ref 1.8–8)
NEUTS SEG NFR BLD: 78 % (ref 40–73)
NITRITE UR QL STRIP.AUTO: NEGATIVE
PH UR STRIP: 7 [PH] (ref 5–8)
PLATELET # BLD AUTO: 283 K/UL (ref 135–420)
PMV BLD AUTO: 10.1 FL (ref 9.2–11.8)
POTASSIUM SERPL-SCNC: 3.8 MMOL/L (ref 3.5–5.5)
PROT SERPL-MCNC: 8.6 G/DL (ref 6.4–8.2)
PROT UR STRIP-MCNC: 30 MG/DL
RBC # BLD AUTO: 4.46 M/UL (ref 4.2–5.3)
RBC #/AREA URNS HPF: ABNORMAL /HPF (ref 0–5)
SODIUM SERPL-SCNC: 143 MMOL/L (ref 136–145)
SP GR UR REFRACTOMETRY: 1.02 (ref 1–1.03)
UROBILINOGEN UR QL STRIP.AUTO: 1 EU/DL (ref 0.2–1)
WBC # BLD AUTO: 7.2 K/UL (ref 4.6–13.2)
WBC URNS QL MICRO: ABNORMAL /HPF (ref 0–5)

## 2020-03-09 PROCEDURE — 96375 TX/PRO/DX INJ NEW DRUG ADDON: CPT

## 2020-03-09 PROCEDURE — 81001 URINALYSIS AUTO W/SCOPE: CPT

## 2020-03-09 PROCEDURE — 99283 EMERGENCY DEPT VISIT LOW MDM: CPT

## 2020-03-09 PROCEDURE — 96374 THER/PROPH/DIAG INJ IV PUSH: CPT

## 2020-03-09 PROCEDURE — 74011250636 HC RX REV CODE- 250/636: Performed by: EMERGENCY MEDICINE

## 2020-03-09 PROCEDURE — 85025 COMPLETE CBC W/AUTO DIFF WBC: CPT

## 2020-03-09 PROCEDURE — 96372 THER/PROPH/DIAG INJ SC/IM: CPT

## 2020-03-09 PROCEDURE — 83690 ASSAY OF LIPASE: CPT

## 2020-03-09 PROCEDURE — 80053 COMPREHEN METABOLIC PANEL: CPT

## 2020-03-09 RX ORDER — ONDANSETRON 2 MG/ML
4 INJECTION INTRAMUSCULAR; INTRAVENOUS
Status: COMPLETED | OUTPATIENT
Start: 2020-03-09 | End: 2020-03-09

## 2020-03-09 RX ORDER — FAMOTIDINE 10 MG/ML
20 INJECTION INTRAVENOUS
Status: COMPLETED | OUTPATIENT
Start: 2020-03-09 | End: 2020-03-09

## 2020-03-09 RX ORDER — DICYCLOMINE HYDROCHLORIDE 10 MG/ML
20 INJECTION INTRAMUSCULAR ONCE
Status: COMPLETED | OUTPATIENT
Start: 2020-03-09 | End: 2020-03-09

## 2020-03-09 RX ORDER — FAMOTIDINE 20 MG/1
20 TABLET, FILM COATED ORAL
Qty: 30 TAB | Refills: 0 | Status: SHIPPED | OUTPATIENT
Start: 2020-03-09

## 2020-03-09 RX ORDER — ONDANSETRON 4 MG/1
4 TABLET, ORALLY DISINTEGRATING ORAL
Qty: 12 TAB | Refills: 0 | Status: SHIPPED | OUTPATIENT
Start: 2020-03-09

## 2020-03-09 RX ORDER — DICYCLOMINE HYDROCHLORIDE 10 MG/1
20 CAPSULE ORAL
Qty: 20 CAP | Refills: 0 | Status: SHIPPED | OUTPATIENT
Start: 2020-03-09 | End: 2020-03-14

## 2020-03-09 RX ADMIN — FAMOTIDINE 20 MG: 10 INJECTION INTRAVENOUS at 04:08

## 2020-03-09 RX ADMIN — SODIUM CHLORIDE 1000 ML: 900 INJECTION, SOLUTION INTRAVENOUS at 04:08

## 2020-03-09 RX ADMIN — DICYCLOMINE HYDROCHLORIDE 20 MG: 20 INJECTION INTRAMUSCULAR at 04:08

## 2020-03-09 RX ADMIN — ONDANSETRON 4 MG: 2 INJECTION INTRAMUSCULAR; INTRAVENOUS at 04:08

## 2020-03-09 NOTE — ED NOTES
I have reviewed discharge instructions with the patient. The patient verbalized understanding. I have reviewed the provider's instructions with the patient, answering all questions to her satisfaction. Discharge medications reviewed with patient and appropriate educational materials and side effects teaching were provided. I have reviewed the provider's instructions with the patient, answering all questions to her satisfaction.

## 2020-03-09 NOTE — ED PROVIDER NOTES
EMERGENCY DEPARTMENT HISTORY AND PHYSICAL EXAM    Date: 3/9/2020  Patient Name: Gume Mercado    History of Presenting Illness     Chief Complaint   Patient presents with    Abdominal Cramping    Vomiting         History Provided By: Patient    Vahe Hoang is a 46 y.o. female with PMHX of anxiety depression who presents to the emergency department C/O abdominal pain and nausea. Reports she was drinking beer tonight and developed nausea with vomiting. States generalized abdominal cramping, epigastric pain, and severe nausea. Denies history of abdominal surgeries. PCP: None    Current Outpatient Medications   Medication Sig Dispense Refill    dicyclomine (BENTYL) 10 mg capsule Take 2 Caps by mouth four (4) times daily as needed for Abdominal Cramps (abd cramps) for up to 5 days. 20 Cap 0    ondansetron (ZOFRAN ODT) 4 mg disintegrating tablet Take 1 Tab by mouth every eight (8) hours as needed for Nausea for up to 12 doses. 12 Tab 0    famotidine (PEPCID) 20 mg tablet Take 1 Tab by mouth two (2) times daily as needed for Pain or Nausea for up to 30 doses. 30 Tab 0       Past History     Past Medical History:  Past Medical History:   Diagnosis Date    Genital herpes     Gonorrhea     Miscarriage     Psychiatric disorder     anxiety, depression       Past Surgical History:  Past Surgical History:   Procedure Laterality Date    HX OTHER SURGICAL             Family History:  History reviewed. No pertinent family history. Social History:  Social History     Tobacco Use    Smoking status: Current Every Day Smoker     Packs/day: 0.25    Smokeless tobacco: Never Used   Substance Use Topics    Alcohol use: Yes     Comment: occasionally     Drug use: Yes     Types: Marijuana, Cocaine     Comment: spice (denies)       Allergies:  No Known Allergies      Review of Systems   Review of Systems   Constitutional: Negative for chills and fever.    HENT: Negative for congestion, rhinorrhea and sinus pain. Respiratory: Negative for cough and shortness of breath. Cardiovascular: Negative for chest pain and palpitations. Gastrointestinal: Positive for abdominal pain, nausea and vomiting. Negative for abdominal distention, blood in stool and diarrhea. Genitourinary: Negative for difficulty urinating and dysuria. Musculoskeletal: Negative for back pain and neck pain. Skin: Negative for color change and rash. Neurological: Negative for dizziness and headaches. All other systems reviewed and are negative. Physical Exam     Vitals:    03/09/20 0225   BP: 120/85   Pulse: 90   Resp: 18   Temp: 98.6 °F (37 °C)   SpO2: 100%   Weight: 63.5 kg (140 lb)   Height: 5' 6\" (1.676 m)     Physical Exam  Vitals signs and nursing note reviewed. Constitutional:       Appearance: She is well-developed. She is not ill-appearing or toxic-appearing. Comments: Appears nauseous    HENT:      Head: Normocephalic and atraumatic. Eyes:      Conjunctiva/sclera: Conjunctivae normal.      Pupils: Pupils are equal, round, and reactive to light. Neck:      Musculoskeletal: Normal range of motion and neck supple. Cardiovascular:      Rate and Rhythm: Normal rate and regular rhythm. Heart sounds: Normal heart sounds. Pulmonary:      Effort: Pulmonary effort is normal. No respiratory distress. Breath sounds: Normal breath sounds. No wheezing or rales. Chest:      Chest wall: No tenderness. Abdominal:      General: There is no distension. Palpations: Abdomen is soft. Tenderness: There is abdominal tenderness (epigastric). There is no guarding or rebound. Musculoskeletal: Normal range of motion. General: No tenderness. Lymphadenopathy:      Cervical: No cervical adenopathy. Skin:     General: Skin is warm and dry. Neurological:      Mental Status: She is alert and oriented to person, place, and time. Cranial Nerves: No cranial nerve deficit.       Motor: No abnormal muscle tone. Coordination: Coordination normal.   Psychiatric:         Behavior: Behavior normal.             Diagnostic Study Results     Labs -     Recent Results (from the past 12 hour(s))   CBC WITH AUTOMATED DIFF    Collection Time: 03/09/20  3:20 AM   Result Value Ref Range    WBC 7.2 4.6 - 13.2 K/uL    RBC 4.46 4.20 - 5.30 M/uL    HGB 13.6 12.0 - 16.0 g/dL    HCT 40.1 35.0 - 45.0 %    MCV 89.9 74.0 - 97.0 FL    MCH 30.5 24.0 - 34.0 PG    MCHC 33.9 31.0 - 37.0 g/dL    RDW 13.8 11.6 - 14.5 %    PLATELET 591 708 - 707 K/uL    MPV 10.1 9.2 - 11.8 FL    NEUTROPHILS 78 (H) 40 - 73 %    LYMPHOCYTES 18 (L) 21 - 52 %    MONOCYTES 4 3 - 10 %    EOSINOPHILS 0 0 - 5 %    BASOPHILS 0 0 - 2 %    ABS. NEUTROPHILS 5.6 1.8 - 8.0 K/UL    ABS. LYMPHOCYTES 1.3 0.9 - 3.6 K/UL    ABS. MONOCYTES 0.3 0.05 - 1.2 K/UL    ABS. EOSINOPHILS 0.0 0.0 - 0.4 K/UL    ABS. BASOPHILS 0.0 0.0 - 0.1 K/UL    DF AUTOMATED     METABOLIC PANEL, COMPREHENSIVE    Collection Time: 03/09/20  3:20 AM   Result Value Ref Range    Sodium 143 136 - 145 mmol/L    Potassium 3.8 3.5 - 5.5 mmol/L    Chloride 110 100 - 111 mmol/L    CO2 24 21 - 32 mmol/L    Anion gap 9 3.0 - 18 mmol/L    Glucose 87 74 - 99 mg/dL    BUN 12 7.0 - 18 MG/DL    Creatinine 0.81 0.6 - 1.3 MG/DL    BUN/Creatinine ratio 15 12 - 20      GFR est AA >60 >60 ml/min/1.73m2    GFR est non-AA >60 >60 ml/min/1.73m2    Calcium 9.3 8.5 - 10.1 MG/DL    Bilirubin, total 0.6 0.2 - 1.0 MG/DL    ALT (SGPT) 18 13 - 56 U/L    AST (SGOT) 21 10 - 38 U/L    Alk.  phosphatase 70 45 - 117 U/L    Protein, total 8.6 (H) 6.4 - 8.2 g/dL    Albumin 4.5 3.4 - 5.0 g/dL    Globulin 4.1 (H) 2.0 - 4.0 g/dL    A-G Ratio 1.1 0.8 - 1.7     LIPASE    Collection Time: 03/09/20  3:20 AM   Result Value Ref Range    Lipase 71 (L) 73 - 393 U/L   URINALYSIS W/ RFLX MICROSCOPIC    Collection Time: 03/09/20  3:25 AM   Result Value Ref Range    Color YELLOW      Appearance CLOUDY      Specific gravity 1.025 1.005 - 1.030 pH (UA) 7.0 5.0 - 8.0      Protein 30 (A) NEG mg/dL    Glucose NEGATIVE  NEG mg/dL    Ketone NEGATIVE  NEG mg/dL    Bilirubin NEGATIVE  NEG      Blood NEGATIVE  NEG      Urobilinogen 1.0 0.2 - 1.0 EU/dL    Nitrites NEGATIVE  NEG      Leukocyte Esterase NEGATIVE  NEG     URINE MICROSCOPIC ONLY    Collection Time: 03/09/20  3:25 AM   Result Value Ref Range    WBC 0 to 2 0 - 5 /hpf    RBC 0 to 2 0 - 5 /hpf    Epithelial cells 3+ 0 - 5 /lpf    Bacteria 2+ (A) NEG /hpf    Mucus 2+ (A) NEG /lpf       Radiologic Studies -   No orders to display     CT Results  (Last 48 hours)    None        CXR Results  (Last 48 hours)    None          Medications given in the ED-  Medications   sodium chloride 0.9 % bolus infusion 1,000 mL (0 mL IntraVENous IV Completed 3/9/20 0505)   ondansetron (ZOFRAN) injection 4 mg (4 mg IntraVENous Given 3/9/20 0408)   famotidine (PF) (PEPCID) injection 20 mg (20 mg IntraVENous Given 3/9/20 0408)   dicyclomine (BENTYL) 10 mg/mL injection 20 mg (20 mg IntraMUSCular Given 3/9/20 0408)         Medical Decision Making   I am the first provider for this patient. I reviewed the vital signs, available nursing notes, past medical history, past surgical history, family history and social history. Vital Signs-Reviewed the patient's vital signs. Pulse Oximetry Analysis - 100% on RA     Cardiac Monitor:  Rate: 90 bpm  Rhythm: NSR      Records Reviewed: Nursing Notes and Old Medical Records    Provider Notes (Medical Decision Making): Karlee Ayala is a 46 y.o. female notes of nausea and vomiting after heavy drinking tonight. Spitting up clear-yellowish emesis. Plan on blood work evaluate for pancreatitis    Procedures:  Procedures    ED Course:   Patient's blood work unremarkable. Her symptoms improved with antiemetics and Bentyl. We will plan on discharge with prescriptions for nausea and vomiting.     Diagnosis and Disposition     Critical Care:     DISCHARGE NOTE:    Bia Arriaga's results have been reviewed with her. She has been counseled regarding her diagnosis, treatment, and plan. She verbally conveys understanding and agreement of the signs, symptoms, diagnosis, treatment and prognosis and additionally agrees to follow up as discussed. She also agrees with the care-plan and conveys that all of her questions have been answered. I have also provided discharge instructions for her that include: educational information regarding their diagnosis and treatment, and list of reasons why they would want to return to the ED prior to their follow-up appointment, should her condition change. She has been provided with education for proper emergency department utilization. CLINICAL IMPRESSION:    1. Non-intractable vomiting with nausea, unspecified vomiting type        PLAN:  1. D/C Home  2. Discharge Medication List as of 3/9/2020  4:00 AM      START taking these medications    Details   dicyclomine (BENTYL) 10 mg capsule Take 2 Caps by mouth four (4) times daily as needed for Abdominal Cramps (abd cramps) for up to 5 days. , Normal, Disp-20 Cap, R-0      ondansetron (ZOFRAN ODT) 4 mg disintegrating tablet Take 1 Tab by mouth every eight (8) hours as needed for Nausea for up to 12 doses. , Normal, Disp-12 Tab, R-0      famotidine (PEPCID) 20 mg tablet Take 1 Tab by mouth two (2) times daily as needed for Pain or Nausea for up to 30 doses. , Normal, Disp-30 Tab, R-0           3. Follow-up Information     Follow up With Specialties Details Why Contact Info    Your primary care doctor            _______________________________      Please note that this dictation was completed with [employer], the [employer] voice recognition software. Quite often unanticipated grammatical, syntax, homophones, and other interpretive errors are inadvertently transcribed by the computer software. Please disregard these errors. Please excuse any errors that have escaped final proofreading.

## 2022-11-28 NOTE — ED NOTES
Presented to ED to be evaluated for reported right lower toothache since 0100. Patient reports pain as documented. Patient states pain radiating into right ear. Patient noted to be sitting up to position of comfort at this time. Covid positive and symptomatic